# Patient Record
Sex: MALE | Race: WHITE | NOT HISPANIC OR LATINO | Employment: OTHER | URBAN - METROPOLITAN AREA
[De-identification: names, ages, dates, MRNs, and addresses within clinical notes are randomized per-mention and may not be internally consistent; named-entity substitution may affect disease eponyms.]

---

## 2023-10-10 ENCOUNTER — HOSPITAL ENCOUNTER (OUTPATIENT)
Dept: CARDIOLOGY | Facility: HOSPITAL | Age: 78
Discharge: HOME | End: 2023-10-10
Admitting: INTERNAL MEDICINE
Payer: MEDICARE

## 2023-10-10 DIAGNOSIS — I25.5 ISCHEMIC CARDIOMYOPATHY: ICD-10-CM

## 2023-10-10 DIAGNOSIS — I10 ESSENTIAL HYPERTENSION: ICD-10-CM

## 2023-10-10 DIAGNOSIS — I25.118 CORONARY ARTERY DISEASE INVOLVING NATIVE CORONARY ARTERY OF NATIVE HEART WITH OTHER FORM OF ANGINA PECTORIS: ICD-10-CM

## 2023-10-10 DIAGNOSIS — E78.2 MIXED HYPERLIPIDEMIA: ICD-10-CM

## 2023-10-10 PROCEDURE — 93306 TTE W/DOPPLER COMPLETE: CPT

## 2023-10-13 ENCOUNTER — TELEPHONE (OUTPATIENT)
Dept: CARDIOLOGY | Facility: CLINIC | Age: 78
End: 2023-10-13
Payer: MEDICARE

## 2023-10-13 LAB
BH CV ECHO MEAS - AO MAX PG: 17.8 MMHG
BH CV ECHO MEAS - AO MEAN PG: 9.1 MMHG
BH CV ECHO MEAS - AO ROOT DIAM: 4 CM
BH CV ECHO MEAS - AO V2 MAX: 210.6 CM/SEC
BH CV ECHO MEAS - AO V2 VTI: 43.4 CM
BH CV ECHO MEAS - AVA(I,D): 1.59 CM2
BH CV ECHO MEAS - EDV(CUBED): 175.6 ML
BH CV ECHO MEAS - EDV(MOD-SP2): 266 ML
BH CV ECHO MEAS - EDV(MOD-SP4): 244 ML
BH CV ECHO MEAS - EF(MOD-BP): 42 %
BH CV ECHO MEAS - EF(MOD-SP2): 42.5 %
BH CV ECHO MEAS - EF(MOD-SP4): 40.6 %
BH CV ECHO MEAS - ESV(CUBED): 79.6 ML
BH CV ECHO MEAS - ESV(MOD-SP2): 153 ML
BH CV ECHO MEAS - ESV(MOD-SP4): 145 ML
BH CV ECHO MEAS - FS: 23.2 %
BH CV ECHO MEAS - IVS/LVPW: 1.12 CM
BH CV ECHO MEAS - IVSD: 1.36 CM
BH CV ECHO MEAS - LA DIMENSION: 3 CM
BH CV ECHO MEAS - LV DIASTOLIC VOL/BSA (35-75): 102.6 CM2
BH CV ECHO MEAS - LV MASS(C)D: 308.3 GRAMS
BH CV ECHO MEAS - LV MAX PG: 2.7 MMHG
BH CV ECHO MEAS - LV MEAN PG: 1.64 MMHG
BH CV ECHO MEAS - LV SYSTOLIC VOL/BSA (12-30): 61 CM2
BH CV ECHO MEAS - LV V1 MAX: 82.4 CM/SEC
BH CV ECHO MEAS - LV V1 VTI: 17.9 CM
BH CV ECHO MEAS - LVIDD: 5.6 CM
BH CV ECHO MEAS - LVIDS: 4.3 CM
BH CV ECHO MEAS - LVOT AREA: 3.9 CM2
BH CV ECHO MEAS - LVOT DIAM: 2.21 CM
BH CV ECHO MEAS - LVPWD: 1.21 CM
BH CV ECHO MEAS - MV A MAX VEL: 93.3 CM/SEC
BH CV ECHO MEAS - MV DEC SLOPE: 605.8 CM/SEC2
BH CV ECHO MEAS - MV E MAX VEL: 52.8 CM/SEC
BH CV ECHO MEAS - MV E/A: 0.57
BH CV ECHO MEAS - MV MAX PG: 6.5 MMHG
BH CV ECHO MEAS - MV MEAN PG: 2.29 MMHG
BH CV ECHO MEAS - MV P1/2T: 39.4 MSEC
BH CV ECHO MEAS - MV V2 VTI: 25.3 CM
BH CV ECHO MEAS - MVA(P1/2T): 5.6 CM2
BH CV ECHO MEAS - MVA(VTI): 2.7 CM2
BH CV ECHO MEAS - PA ACC SLOPE: 602.8 CM/SEC2
BH CV ECHO MEAS - PA ACC TIME: 0.11 SEC
BH CV ECHO MEAS - SI(MOD-SP2): 47.5 ML/M2
BH CV ECHO MEAS - SI(MOD-SP4): 41.6 ML/M2
BH CV ECHO MEAS - SV(LVOT): 69.1 ML
BH CV ECHO MEAS - SV(MOD-SP2): 113 ML
BH CV ECHO MEAS - SV(MOD-SP4): 99 ML
BH CV ECHO MEAS - TAPSE (>1.6): 1.5 CM
BH CV XLRA - RV BASE: 4.7 CM
BH CV XLRA - RV LENGTH: 7.2 CM
BH CV XLRA - RV MID: 2.2 CM
BH CV XLRA - TDI S': 9.54 CM/SEC
LEFT ATRIUM VOLUME INDEX: 16 ML/M2
LV EF 2D ECHO EST: 40 %

## 2023-10-13 NOTE — TELEPHONE ENCOUNTER
Called patient to relay echo results.     Patient verbalizes understanding and is agreeable to plan of care.

## 2023-12-19 RX ORDER — ASPIRIN 325 MG
TABLET ORAL
Qty: 90 TABLET | Refills: 3 | Status: SHIPPED | OUTPATIENT
Start: 2023-12-19

## 2024-01-26 RX ORDER — SACUBITRIL AND VALSARTAN 24; 26 MG/1; MG/1
1 TABLET, FILM COATED ORAL 2 TIMES DAILY
Qty: 180 TABLET | Refills: 0 | Status: SHIPPED | OUTPATIENT
Start: 2024-01-26

## 2024-01-29 RX ORDER — ISOSORBIDE MONONITRATE 30 MG/1
30 TABLET, EXTENDED RELEASE ORAL DAILY
Qty: 90 TABLET | Refills: 1 | Status: SHIPPED | OUTPATIENT
Start: 2024-01-29

## 2024-02-15 ENCOUNTER — TELEPHONE (OUTPATIENT)
Dept: CARDIOLOGY | Facility: CLINIC | Age: 79
End: 2024-02-15
Payer: MEDICARE

## 2024-02-16 ENCOUNTER — OFFICE VISIT (OUTPATIENT)
Dept: CARDIOLOGY | Facility: CLINIC | Age: 79
End: 2024-02-16
Payer: MEDICARE

## 2024-02-16 VITALS
HEIGHT: 74 IN | DIASTOLIC BLOOD PRESSURE: 82 MMHG | HEART RATE: 87 BPM | BODY MASS INDEX: 29.57 KG/M2 | SYSTOLIC BLOOD PRESSURE: 132 MMHG | OXYGEN SATURATION: 96 % | WEIGHT: 230.4 LBS

## 2024-02-16 DIAGNOSIS — I25.5 ISCHEMIC CARDIOMYOPATHY: ICD-10-CM

## 2024-02-16 DIAGNOSIS — E78.2 MIXED HYPERLIPIDEMIA: ICD-10-CM

## 2024-02-16 DIAGNOSIS — I25.10 CORONARY ARTERY DISEASE INVOLVING NATIVE CORONARY ARTERY OF NATIVE HEART WITHOUT ANGINA PECTORIS: Primary | ICD-10-CM

## 2024-02-16 DIAGNOSIS — I10 ESSENTIAL HYPERTENSION: ICD-10-CM

## 2024-02-16 RX ORDER — ROSUVASTATIN CALCIUM 5 MG/1
5 TABLET, COATED ORAL NIGHTLY
COMMUNITY

## 2024-02-26 RX ORDER — CARVEDILOL 12.5 MG/1
TABLET ORAL
Qty: 180 TABLET | Refills: 2 | Status: SHIPPED | OUTPATIENT
Start: 2024-02-26

## 2024-03-16 ENCOUNTER — HOSPITAL ENCOUNTER (EMERGENCY)
Facility: HOSPITAL | Age: 79
Discharge: HOME | End: 2024-03-17
Attending: EMERGENCY MEDICINE | Admitting: EMERGENCY MEDICINE
Payer: MEDICARE

## 2024-03-16 DIAGNOSIS — I10 ELEVATED BLOOD PRESSURE READING WITH DIAGNOSIS OF HYPERTENSION: Primary | ICD-10-CM

## 2024-03-16 DIAGNOSIS — R09.89 LABILE BLOOD PRESSURE: ICD-10-CM

## 2024-03-16 PROCEDURE — 99284 EMERGENCY DEPT VISIT MOD MDM: CPT

## 2024-03-17 ENCOUNTER — APPOINTMENT (OUTPATIENT)
Dept: GENERAL RADIOLOGY | Facility: HOSPITAL | Age: 79
End: 2024-03-17
Payer: MEDICARE

## 2024-03-17 VITALS
WEIGHT: 230 LBS | RESPIRATION RATE: 18 BRPM | OXYGEN SATURATION: 94 % | BODY MASS INDEX: 29.52 KG/M2 | TEMPERATURE: 97.9 F | SYSTOLIC BLOOD PRESSURE: 152 MMHG | HEART RATE: 78 BPM | DIASTOLIC BLOOD PRESSURE: 90 MMHG | HEIGHT: 74 IN

## 2024-03-17 LAB
ALBUMIN SERPL-MCNC: 4.2 G/DL (ref 3.5–5.2)
ALBUMIN/GLOB SERPL: 1.4 G/DL
ALP SERPL-CCNC: 69 U/L (ref 39–117)
ALT SERPL W P-5'-P-CCNC: 22 U/L (ref 1–41)
ANION GAP SERPL CALCULATED.3IONS-SCNC: 11 MMOL/L (ref 5–15)
AST SERPL-CCNC: 21 U/L (ref 1–40)
BASOPHILS # BLD AUTO: 0.06 10*3/MM3 (ref 0–0.2)
BASOPHILS NFR BLD AUTO: 0.9 % (ref 0–1.5)
BILIRUB SERPL-MCNC: 0.5 MG/DL (ref 0–1.2)
BILIRUB UR QL STRIP: NEGATIVE
BUN SERPL-MCNC: 15 MG/DL (ref 8–23)
BUN/CREAT SERPL: 20 (ref 7–25)
CALCIUM SPEC-SCNC: 9.2 MG/DL (ref 8.6–10.5)
CHLORIDE SERPL-SCNC: 100 MMOL/L (ref 98–107)
CLARITY UR: CLEAR
CO2 SERPL-SCNC: 25 MMOL/L (ref 22–29)
COLOR UR: YELLOW
CREAT SERPL-MCNC: 0.75 MG/DL (ref 0.76–1.27)
DEPRECATED RDW RBC AUTO: 43.8 FL (ref 37–54)
EGFRCR SERPLBLD CKD-EPI 2021: 91.2 ML/MIN/1.73
EOSINOPHIL # BLD AUTO: 0.22 10*3/MM3 (ref 0–0.4)
EOSINOPHIL NFR BLD AUTO: 3.3 % (ref 0.3–6.2)
ERYTHROCYTE [DISTWIDTH] IN BLOOD BY AUTOMATED COUNT: 13.8 % (ref 12.3–15.4)
FLUAV SUBTYP SPEC NAA+PROBE: NOT DETECTED
FLUBV RNA ISLT QL NAA+PROBE: NOT DETECTED
GLOBULIN UR ELPH-MCNC: 2.9 GM/DL
GLUCOSE SERPL-MCNC: 117 MG/DL (ref 65–99)
GLUCOSE UR STRIP-MCNC: NEGATIVE MG/DL
HCT VFR BLD AUTO: 42 % (ref 37.5–51)
HGB BLD-MCNC: 14.1 G/DL (ref 13–17.7)
HGB UR QL STRIP.AUTO: NEGATIVE
IMM GRANULOCYTES # BLD AUTO: 0.04 10*3/MM3 (ref 0–0.05)
IMM GRANULOCYTES NFR BLD AUTO: 0.6 % (ref 0–0.5)
KETONES UR QL STRIP: ABNORMAL
LEUKOCYTE ESTERASE UR QL STRIP.AUTO: NEGATIVE
LIPASE SERPL-CCNC: 24 U/L (ref 13–60)
LYMPHOCYTES # BLD AUTO: 1.86 10*3/MM3 (ref 0.7–3.1)
LYMPHOCYTES NFR BLD AUTO: 27.8 % (ref 19.6–45.3)
MCH RBC QN AUTO: 29 PG (ref 26.6–33)
MCHC RBC AUTO-ENTMCNC: 33.6 G/DL (ref 31.5–35.7)
MCV RBC AUTO: 86.4 FL (ref 79–97)
MONOCYTES # BLD AUTO: 0.61 10*3/MM3 (ref 0.1–0.9)
MONOCYTES NFR BLD AUTO: 9.1 % (ref 5–12)
NEUTROPHILS NFR BLD AUTO: 3.9 10*3/MM3 (ref 1.7–7)
NEUTROPHILS NFR BLD AUTO: 58.3 % (ref 42.7–76)
NITRITE UR QL STRIP: NEGATIVE
NRBC BLD AUTO-RTO: 0 /100 WBC (ref 0–0.2)
NT-PROBNP SERPL-MCNC: 1124 PG/ML (ref 0–1800)
PH UR STRIP.AUTO: 5.5 [PH] (ref 5–8)
PLATELET # BLD AUTO: 260 10*3/MM3 (ref 140–450)
PMV BLD AUTO: 9.9 FL (ref 6–12)
POTASSIUM SERPL-SCNC: 4.4 MMOL/L (ref 3.5–5.2)
PROT SERPL-MCNC: 7.1 G/DL (ref 6–8.5)
PROT UR QL STRIP: NEGATIVE
QT INTERVAL: 378 MS
QT INTERVAL: 400 MS
QTC INTERVAL: 419 MS
QTC INTERVAL: 422 MS
RBC # BLD AUTO: 4.86 10*6/MM3 (ref 4.14–5.8)
SARS-COV-2 RNA RESP QL NAA+PROBE: NOT DETECTED
SODIUM SERPL-SCNC: 136 MMOL/L (ref 136–145)
SP GR UR STRIP: 1.02 (ref 1–1.03)
TROPONIN T SERPL HS-MCNC: 34 NG/L
TROPONIN T SERPL HS-MCNC: 35 NG/L
UROBILINOGEN UR QL STRIP: ABNORMAL
WBC NRBC COR # BLD AUTO: 6.69 10*3/MM3 (ref 3.4–10.8)

## 2024-03-17 PROCEDURE — 36415 COLL VENOUS BLD VENIPUNCTURE: CPT

## 2024-03-17 PROCEDURE — 83880 ASSAY OF NATRIURETIC PEPTIDE: CPT | Performed by: EMERGENCY MEDICINE

## 2024-03-17 PROCEDURE — 87636 SARSCOV2 & INF A&B AMP PRB: CPT | Performed by: EMERGENCY MEDICINE

## 2024-03-17 PROCEDURE — 81003 URINALYSIS AUTO W/O SCOPE: CPT | Performed by: EMERGENCY MEDICINE

## 2024-03-17 PROCEDURE — 71045 X-RAY EXAM CHEST 1 VIEW: CPT

## 2024-03-17 PROCEDURE — 83690 ASSAY OF LIPASE: CPT | Performed by: EMERGENCY MEDICINE

## 2024-03-17 PROCEDURE — 85025 COMPLETE CBC W/AUTO DIFF WBC: CPT | Performed by: EMERGENCY MEDICINE

## 2024-03-17 PROCEDURE — 93005 ELECTROCARDIOGRAM TRACING: CPT | Performed by: EMERGENCY MEDICINE

## 2024-03-17 PROCEDURE — 80053 COMPREHEN METABOLIC PANEL: CPT | Performed by: EMERGENCY MEDICINE

## 2024-03-17 PROCEDURE — 84484 ASSAY OF TROPONIN QUANT: CPT | Performed by: EMERGENCY MEDICINE

## 2024-03-17 RX ORDER — SODIUM CHLORIDE 0.9 % (FLUSH) 0.9 %
10 SYRINGE (ML) INJECTION AS NEEDED
Status: DISCONTINUED | OUTPATIENT
Start: 2024-03-17 | End: 2024-03-17 | Stop reason: HOSPADM

## 2024-03-17 NOTE — ED PROVIDER NOTES
Subjective   History of Present Illness  79-year-old male who presents for evaluation of mild generalized fatigue and elevated blood pressure.  The patient has a known history of hypertension.  He reports that he recently was diagnosed with urinary tract infection and completed a course of antibiotics.  He states that while he was taking those antibiotics his blood pressure was elevated.  However it seemed to improve after completion of the antibiotics.  Over the last 3 days he has noted his blood pressure to be elevated.  He reports feeling mildly fatigued but denies any other symptoms.  No chest pain, cough, shortness of breath.  No abdominal pain.  No nausea or vomiting.  No change in bowel or urinary function.  No fever or infectious symptoms.  He is awake and alert with normal mentation, normal neurological exam, GCS 15.  His blood pressure was 187/112 prior to arrival.  Because of this he did take a sublingual nitroglycerin.  He denies ever having any chest pain or shortness of breath.  Upon arrival here he does report feeling better.      Review of Systems   Constitutional:  Positive for fatigue. Negative for chills and fever.   HENT:  Negative for congestion, ear pain, postnasal drip, sinus pressure and sore throat.    Eyes:  Negative for pain, redness and visual disturbance.   Respiratory:  Negative for cough, chest tightness and shortness of breath.    Cardiovascular:  Negative for chest pain, palpitations and leg swelling.   Gastrointestinal:  Negative for abdominal pain, anal bleeding, blood in stool, diarrhea, nausea and vomiting.   Endocrine: Negative for polydipsia and polyuria.   Genitourinary:  Negative for difficulty urinating, dysuria, frequency and urgency.   Musculoskeletal:  Negative for arthralgias, back pain and neck pain.   Skin:  Negative for pallor and rash.   Allergic/Immunologic: Negative for environmental allergies and immunocompromised state.   Neurological:  Negative for dizziness,  weakness and headaches.   Hematological:  Negative for adenopathy.   Psychiatric/Behavioral:  Negative for confusion, self-injury and suicidal ideas. The patient is not nervous/anxious.    All other systems reviewed and are negative.      Past Medical History:   Diagnosis Date    Coronary artery disease     COVID-19     Heart attack     HTN (hypertension)     Hyperlipidemia, unspecified        No Known Allergies    Past Surgical History:   Procedure Laterality Date    CARDIAC CATHETERIZATION N/A 05/27/2018    Procedure: Left Heart Cath;  Surgeon: Boaz Varela MD;  Location:  [facility];  Service: Cardiology    COLONOSCOPY N/A 11/01/2021    Procedure: COLONOSCOPY;  Surgeon: Brunner, Mark I, MD;  Location:  [facility];  Service: Gastroenterology;  Laterality: N/A;    CORONARY STENT PLACEMENT      KNEE SURGERY      KNEE SURGERY      SKIN SURGERY  05/2023    removal of sun spots    TOE SURGERY Right        Family History   Problem Relation Age of Onset    Heart failure Mother     Cancer Father     Coronary artery disease Brother     Obesity Brother     Osteoarthritis Brother        Social History     Socioeconomic History    Marital status:     Number of children: 2   Tobacco Use    Smoking status: Former     Current packs/day: 1.00     Types: Cigarettes    Smokeless tobacco: Never    Tobacco comments:     teenage years   Vaping Use    Vaping status: Never Used   Substance and Sexual Activity    Alcohol use: No    Drug use: No    Sexual activity: Defer           Objective   Physical Exam  Vitals and nursing note reviewed.   Constitutional:       General: He is not in acute distress.     Appearance: Normal appearance. He is well-developed. He is not toxic-appearing or diaphoretic.   HENT:      Head: Normocephalic and atraumatic.      Right Ear: External ear normal.      Left Ear: External ear normal.      Nose: Nose normal.   Eyes:      General: Lids are normal.      Pupils: Pupils are  equal, round, and reactive to light.   Neck:      Trachea: No tracheal deviation.   Cardiovascular:      Rate and Rhythm: Normal rate and regular rhythm.      Pulses: No decreased pulses.      Heart sounds: Normal heart sounds. No murmur heard.     No friction rub. No gallop.   Pulmonary:      Effort: Pulmonary effort is normal. No respiratory distress.      Breath sounds: Normal breath sounds. No decreased breath sounds, wheezing, rhonchi or rales.   Abdominal:      General: Bowel sounds are normal.      Palpations: Abdomen is soft.      Tenderness: There is no abdominal tenderness. There is no guarding or rebound.   Musculoskeletal:         General: No deformity. Normal range of motion.      Cervical back: Normal range of motion and neck supple.   Lymphadenopathy:      Cervical: No cervical adenopathy.   Skin:     General: Skin is warm and dry.      Findings: No rash.   Neurological:      Mental Status: He is alert and oriented to person, place, and time.      Cranial Nerves: No cranial nerve deficit.      Sensory: No sensory deficit.   Psychiatric:         Speech: Speech normal.         Behavior: Behavior normal.         Thought Content: Thought content normal.         Judgment: Judgment normal.         Procedures           ED Course  ED Course as of 03/17/24 1205   Sun Mar 17, 2024   0044 XR Chest 1 View [NS]      ED Course User Index  [NS] Josephine Reaves MD                                             Medical Decision Making  Differential diagnosis includes essential hypertension, hypertensive urgency, hypertensive emergency, acute renal insufficiency, acute coronary syndrome, CHF exacerbation, other unspecified etiology.    Urine does not show any signs of infection.  COVID and flu test are negative.  Chest x-ray shows no acute disease.    The patient's blood pressure has trended down with observation alone.  He has remained well-appearing with normal neurological exam and no signs of an acute  stroke.    Lab work is pending at this given time.  Initial troponin is 34.  Repeat troponin is pending.    Repeat troponin essentially unchanged.    Chemistries evaluation show no signs of acute endorgan damage secondary to high blood pressure.    The patient has remained well-appearing throughout the ER course.    Will be discharged with advised to follow-up primary care physician to discuss further blood pressure management.    He is advised to check his blood pressure twice daily.  He should be sitting in the chair with his arm at heart level for roughly 5 to 10 minutes.  Write down how you feel before you record your blood pressure.  After reporting several weeks of data take that to the primary care physician.    Problems Addressed:  Elevated blood pressure reading with diagnosis of hypertension: complicated acute illness or injury with systemic symptoms  Labile blood pressure: complicated acute illness or injury with systemic symptoms    Amount and/or Complexity of Data Reviewed  External Data Reviewed: labs, radiology and ECG.  Labs: ordered. Decision-making details documented in ED Course.  Radiology: ordered and independent interpretation performed. Decision-making details documented in ED Course.  ECG/medicine tests: ordered and independent interpretation performed. Decision-making details documented in ED Course.     Details: EKG independently interpreted myself shows sinus rhythm with first-degree AV block and T wave inversions in the high lateral leads.  The T wave inversions are unchanged relative to previous comparison in July 2023.    Risk  Prescription drug management.        Final diagnoses:   Elevated blood pressure reading with diagnosis of hypertension   Labile blood pressure       ED Disposition  ED Disposition       ED Disposition   Discharge    Condition   Stable    Comment   --               Gabriela Olea APRN  [address] [place] [id number] [phone]    Call       [facility]  [place] EMERGENCY DEPARTMENT  [address] [facility] [address]  [phone]  Go to   If symptoms worsen         Medication List      No changes were made to your prescriptions during this visit.            Josephine Reaves MD  03/17/24 [id number]

## 2024-03-18 ENCOUNTER — TELEPHONE (OUTPATIENT)
Dept: CARDIOLOGY | Facility: CLINIC | Age: 79
End: 2024-03-18
Payer: MEDICARE

## 2024-03-18 NOTE — TELEPHONE ENCOUNTER
Patient called today to report that he has been experiencing frequent low BP. Patient states before taking his medication his BP is 160s. After meds BP was 100-110s. He was told by Dr. Varela to take half a tab of entresto each day unless his BP is greater than 140, take a whole pill. He has taken a whole pill each day for 6 days.     Patient states he was just in the ER for hypertension on 3/16 when his BP was 180/100. At ER patient's -150s. No medications given. Patient monitored and DC a couple hours later.    Patient seems very anxious about his BP and takes it very often. Advised patient to only take BP 1-2 hours after taking his medication. Patient states he is trying to make sure it comes up/goes down.     Please advise! Thanks,   Gifty MOORE

## 2024-03-29 LAB
QT INTERVAL: 378 MS
QT INTERVAL: 400 MS
QTC INTERVAL: 419 MS
QTC INTERVAL: 422 MS

## 2024-04-26 RX ORDER — SACUBITRIL AND VALSARTAN 24; 26 MG/1; MG/1
1 TABLET, FILM COATED ORAL 2 TIMES DAILY
Qty: 180 TABLET | Refills: 0 | Status: SHIPPED | OUTPATIENT
Start: 2024-04-26

## 2024-06-05 ENCOUNTER — APPOINTMENT (OUTPATIENT)
Dept: GENERAL RADIOLOGY | Facility: HOSPITAL | Age: 79
End: 2024-06-05
Payer: MEDICARE

## 2024-06-05 ENCOUNTER — HOSPITAL ENCOUNTER (OUTPATIENT)
Facility: HOSPITAL | Age: 79
Setting detail: OBSERVATION
Discharge: HOME | End: 2024-06-06
Attending: EMERGENCY MEDICINE | Admitting: INTERNAL MEDICINE
Payer: MEDICARE

## 2024-06-05 DIAGNOSIS — I25.2 HISTORY OF MYOCARDIAL INFARCTION: ICD-10-CM

## 2024-06-05 DIAGNOSIS — M79.602 LEFT ARM PAIN: ICD-10-CM

## 2024-06-05 DIAGNOSIS — R07.89 ATYPICAL CHEST PAIN: Primary | ICD-10-CM

## 2024-06-05 DIAGNOSIS — Z86.39 HISTORY OF HYPERLIPIDEMIA: ICD-10-CM

## 2024-06-05 DIAGNOSIS — Z86.79 HISTORY OF CORONARY ARTERY DISEASE: ICD-10-CM

## 2024-06-05 DIAGNOSIS — Z86.79 HISTORY OF HYPERTENSION: ICD-10-CM

## 2024-06-05 PROBLEM — R07.9 CHEST PAIN: Status: ACTIVE | Noted: 2024-06-05

## 2024-06-05 LAB
ALBUMIN SERPL-MCNC: 4.4 G/DL (ref 3.5–5.2)
ALBUMIN/GLOB SERPL: 1.5 G/DL
ALP SERPL-CCNC: 52 U/L (ref 39–117)
ALT SERPL W P-5'-P-CCNC: 17 U/L (ref 1–41)
ANION GAP SERPL CALCULATED.3IONS-SCNC: 10 MMOL/L (ref 5–15)
AST SERPL-CCNC: 18 U/L (ref 1–40)
BASOPHILS # BLD AUTO: 0.07 10*3/MM3 (ref 0–0.2)
BASOPHILS NFR BLD AUTO: 0.9 % (ref 0–1.5)
BILIRUB SERPL-MCNC: 0.3 MG/DL (ref 0–1.2)
BUN SERPL-MCNC: 22 MG/DL (ref 8–23)
BUN/CREAT SERPL: 18.5 (ref 7–25)
CALCIUM SPEC-SCNC: 9.7 MG/DL (ref 8.6–10.5)
CHLORIDE SERPL-SCNC: 106 MMOL/L (ref 98–107)
CO2 SERPL-SCNC: 25 MMOL/L (ref 22–29)
CREAT SERPL-MCNC: 1.19 MG/DL (ref 0.76–1.27)
DEPRECATED RDW RBC AUTO: 44.3 FL (ref 37–54)
EGFRCR SERPLBLD CKD-EPI 2021: 61.8 ML/MIN/1.73
EOSINOPHIL # BLD AUTO: 0.16 10*3/MM3 (ref 0–0.4)
EOSINOPHIL NFR BLD AUTO: 2.1 % (ref 0.3–6.2)
ERYTHROCYTE [DISTWIDTH] IN BLOOD BY AUTOMATED COUNT: 13.7 % (ref 12.3–15.4)
GEN 5 2HR TROPONIN T REFLEX: 23 NG/L
GLOBULIN UR ELPH-MCNC: 2.9 GM/DL
GLUCOSE SERPL-MCNC: 124 MG/DL (ref 65–99)
HCT VFR BLD AUTO: 44.5 % (ref 37.5–51)
HGB BLD-MCNC: 14.7 G/DL (ref 13–17.7)
HOLD SPECIMEN: NORMAL
IMM GRANULOCYTES # BLD AUTO: 0.04 10*3/MM3 (ref 0–0.05)
IMM GRANULOCYTES NFR BLD AUTO: 0.5 % (ref 0–0.5)
LIPASE SERPL-CCNC: 16 U/L (ref 13–60)
LYMPHOCYTES # BLD AUTO: 1.9 10*3/MM3 (ref 0.7–3.1)
LYMPHOCYTES NFR BLD AUTO: 24.9 % (ref 19.6–45.3)
MCH RBC QN AUTO: 28.9 PG (ref 26.6–33)
MCHC RBC AUTO-ENTMCNC: 33 G/DL (ref 31.5–35.7)
MCV RBC AUTO: 87.6 FL (ref 79–97)
MONOCYTES # BLD AUTO: 0.77 10*3/MM3 (ref 0.1–0.9)
MONOCYTES NFR BLD AUTO: 10.1 % (ref 5–12)
NEUTROPHILS NFR BLD AUTO: 4.69 10*3/MM3 (ref 1.7–7)
NEUTROPHILS NFR BLD AUTO: 61.5 % (ref 42.7–76)
NRBC BLD AUTO-RTO: 0 /100 WBC (ref 0–0.2)
NT-PROBNP SERPL-MCNC: 1036 PG/ML (ref 0–1800)
PLATELET # BLD AUTO: 221 10*3/MM3 (ref 140–450)
PMV BLD AUTO: 9.8 FL (ref 6–12)
POTASSIUM SERPL-SCNC: 4.2 MMOL/L (ref 3.5–5.2)
PROT SERPL-MCNC: 7.3 G/DL (ref 6–8.5)
RBC # BLD AUTO: 5.08 10*6/MM3 (ref 4.14–5.8)
SODIUM SERPL-SCNC: 141 MMOL/L (ref 136–145)
TROPONIN T DELTA: 0 NG/L
TROPONIN T SERPL HS-MCNC: 23 NG/L
WBC NRBC COR # BLD AUTO: 7.63 10*3/MM3 (ref 3.4–10.8)
WHOLE BLOOD HOLD COAG: NORMAL
WHOLE BLOOD HOLD SPECIMEN: NORMAL

## 2024-06-05 PROCEDURE — 85379 FIBRIN DEGRADATION QUANT: CPT | Performed by: NURSE PRACTITIONER

## 2024-06-05 PROCEDURE — 86140 C-REACTIVE PROTEIN: CPT | Performed by: NURSE PRACTITIONER

## 2024-06-05 PROCEDURE — 71045 X-RAY EXAM CHEST 1 VIEW: CPT

## 2024-06-05 PROCEDURE — 85025 COMPLETE CBC W/AUTO DIFF WBC: CPT

## 2024-06-05 PROCEDURE — 93005 ELECTROCARDIOGRAM TRACING: CPT

## 2024-06-05 PROCEDURE — 99285 EMERGENCY DEPT VISIT HI MDM: CPT

## 2024-06-05 PROCEDURE — 84484 ASSAY OF TROPONIN QUANT: CPT

## 2024-06-05 PROCEDURE — 83880 ASSAY OF NATRIURETIC PEPTIDE: CPT

## 2024-06-05 PROCEDURE — 36415 COLL VENOUS BLD VENIPUNCTURE: CPT

## 2024-06-05 PROCEDURE — 80053 COMPREHEN METABOLIC PANEL: CPT

## 2024-06-05 PROCEDURE — 85652 RBC SED RATE AUTOMATED: CPT | Performed by: NURSE PRACTITIONER

## 2024-06-05 PROCEDURE — 83690 ASSAY OF LIPASE: CPT

## 2024-06-05 RX ORDER — SODIUM CHLORIDE 0.9 % (FLUSH) 0.9 %
10 SYRINGE (ML) INJECTION AS NEEDED
Status: DISCONTINUED | OUTPATIENT
Start: 2024-06-05 | End: 2024-06-06 | Stop reason: SDUPTHER

## 2024-06-05 RX ORDER — ASPIRIN 81 MG/1
324 TABLET, CHEWABLE ORAL ONCE
Status: DISCONTINUED | OUTPATIENT
Start: 2024-06-05 | End: 2024-06-06

## 2024-06-05 NOTE — Clinical Note
Level of Care: Telemetry [5]   Diagnosis: Chest pain [028097]   Admitting Physician: GWEN KENNEDY [[id number]]   Attending Physician: GWEN KENNEDY [[id number]]   Bed Request Comments: tele

## 2024-06-06 ENCOUNTER — APPOINTMENT (OUTPATIENT)
Dept: GENERAL RADIOLOGY | Facility: HOSPITAL | Age: 79
End: 2024-06-06
Payer: MEDICARE

## 2024-06-06 ENCOUNTER — APPOINTMENT (OUTPATIENT)
Dept: CARDIOLOGY | Facility: HOSPITAL | Age: 79
End: 2024-06-06
Payer: MEDICARE

## 2024-06-06 VITALS
RESPIRATION RATE: 17 BRPM | BODY MASS INDEX: 29.14 KG/M2 | DIASTOLIC BLOOD PRESSURE: 88 MMHG | TEMPERATURE: 98.4 F | SYSTOLIC BLOOD PRESSURE: 116 MMHG | WEIGHT: 227.07 LBS | HEIGHT: 74 IN | OXYGEN SATURATION: 94 % | HEART RATE: 109 BPM

## 2024-06-06 PROBLEM — M25.512 ACUTE PAIN OF LEFT SHOULDER: Status: ACTIVE | Noted: 2024-06-06

## 2024-06-06 PROBLEM — M25.512 ACUTE PAIN OF LEFT SHOULDER: Status: RESOLVED | Noted: 2024-06-06 | Resolved: 2024-06-06

## 2024-06-06 PROBLEM — R07.9 CHEST PAIN: Status: RESOLVED | Noted: 2024-06-05 | Resolved: 2024-06-06

## 2024-06-06 LAB
ANION GAP SERPL CALCULATED.3IONS-SCNC: 12 MMOL/L (ref 5–15)
BASOPHILS # BLD AUTO: 0.05 10*3/MM3 (ref 0–0.2)
BASOPHILS NFR BLD AUTO: 0.6 % (ref 0–1.5)
BH CV REST NUCLEAR ISOTOPE DOSE: 29.9 MCI
BH CV STRESS BP STAGE 1: NORMAL
BH CV STRESS BP STAGE 4: NORMAL
BH CV STRESS COMMENTS STAGE 1: NORMAL
BH CV STRESS DOSE REGADENOSON STAGE 1: 0.4
BH CV STRESS DURATION MIN STAGE 1: 1
BH CV STRESS DURATION MIN STAGE 2: 1
BH CV STRESS DURATION MIN STAGE 3: 1
BH CV STRESS DURATION MIN STAGE 4: 1
BH CV STRESS DURATION SEC STAGE 1: 0
BH CV STRESS DURATION SEC STAGE 2: 0
BH CV STRESS DURATION SEC STAGE 3: 0
BH CV STRESS DURATION SEC STAGE 4: 0
BH CV STRESS HR STAGE 1: 80
BH CV STRESS HR STAGE 2: 92
BH CV STRESS HR STAGE 3: 96
BH CV STRESS HR STAGE 4: 98
BH CV STRESS NUCLEAR ISOTOPE DOSE: 29.9 MCI
BH CV STRESS O2 STAGE 1: 96
BH CV STRESS O2 STAGE 2: 96
BH CV STRESS O2 STAGE 3: 98
BH CV STRESS O2 STAGE 4: 98
BH CV STRESS PROTOCOL 1: NORMAL
BH CV STRESS RECOVERY BP: NORMAL MMHG
BH CV STRESS RECOVERY HR: 87 BPM
BH CV STRESS RECOVERY O2: 98 %
BH CV STRESS STAGE 1: 1
BH CV STRESS STAGE 2: 2
BH CV STRESS STAGE 3: 3
BH CV STRESS STAGE 4: 4
BUN SERPL-MCNC: 17 MG/DL (ref 8–23)
BUN/CREAT SERPL: 19.1 (ref 7–25)
CALCIUM SPEC-SCNC: 9.6 MG/DL (ref 8.6–10.5)
CHLORIDE SERPL-SCNC: 105 MMOL/L (ref 98–107)
CHOLEST SERPL-MCNC: 143 MG/DL (ref 0–200)
CO2 SERPL-SCNC: 24 MMOL/L (ref 22–29)
CREAT SERPL-MCNC: 0.89 MG/DL (ref 0.76–1.27)
CRP SERPL-MCNC: <0.3 MG/DL (ref 0–0.5)
D DIMER PPP FEU-MCNC: <0.27 MCGFEU/ML (ref 0–0.8)
DEPRECATED RDW RBC AUTO: 42 FL (ref 37–54)
EGFRCR SERPLBLD CKD-EPI 2021: 86.6 ML/MIN/1.73
EOSINOPHIL # BLD AUTO: 0.09 10*3/MM3 (ref 0–0.4)
EOSINOPHIL NFR BLD AUTO: 1 % (ref 0.3–6.2)
ERYTHROCYTE [DISTWIDTH] IN BLOOD BY AUTOMATED COUNT: 13.5 % (ref 12.3–15.4)
ERYTHROCYTE [SEDIMENTATION RATE] IN BLOOD: 14 MM/HR (ref 0–20)
GLUCOSE SERPL-MCNC: 125 MG/DL (ref 65–99)
HBA1C MFR BLD: 5.5 % (ref 4.8–5.6)
HCT VFR BLD AUTO: 44.8 % (ref 37.5–51)
HDLC SERPL-MCNC: 43 MG/DL (ref 40–60)
HGB BLD-MCNC: 15.2 G/DL (ref 13–17.7)
IMM GRANULOCYTES # BLD AUTO: 0.03 10*3/MM3 (ref 0–0.05)
IMM GRANULOCYTES NFR BLD AUTO: 0.3 % (ref 0–0.5)
LDLC SERPL CALC-MCNC: 78 MG/DL (ref 0–100)
LDLC/HDLC SERPL: 1.76 {RATIO}
LV EF NUC BP: 37 %
LYMPHOCYTES # BLD AUTO: 1.88 10*3/MM3 (ref 0.7–3.1)
LYMPHOCYTES NFR BLD AUTO: 21.8 % (ref 19.6–45.3)
MAXIMAL PREDICTED HEART RATE: 140 BPM
MCH RBC QN AUTO: 28.7 PG (ref 26.6–33)
MCHC RBC AUTO-ENTMCNC: 33.9 G/DL (ref 31.5–35.7)
MCV RBC AUTO: 84.5 FL (ref 79–97)
MONOCYTES # BLD AUTO: 0.67 10*3/MM3 (ref 0.1–0.9)
MONOCYTES NFR BLD AUTO: 7.8 % (ref 5–12)
NEUTROPHILS NFR BLD AUTO: 5.92 10*3/MM3 (ref 1.7–7)
NEUTROPHILS NFR BLD AUTO: 68.5 % (ref 42.7–76)
NRBC BLD AUTO-RTO: 0 /100 WBC (ref 0–0.2)
PERCENT MAX PREDICTED HR: 70 %
PLATELET # BLD AUTO: 222 10*3/MM3 (ref 140–450)
PMV BLD AUTO: 9.7 FL (ref 6–12)
POTASSIUM SERPL-SCNC: 3.8 MMOL/L (ref 3.5–5.2)
QT INTERVAL: 406 MS
QTC INTERVAL: 447 MS
RBC # BLD AUTO: 5.3 10*6/MM3 (ref 4.14–5.8)
SODIUM SERPL-SCNC: 141 MMOL/L (ref 136–145)
STRESS BASELINE BP: NORMAL MMHG
STRESS BASELINE HR: 71 BPM
STRESS O2 SAT REST: 95 %
STRESS PERCENT HR: 82 %
STRESS POST ESTIMATED WORKLOAD: 1 METS
STRESS POST EXERCISE DUR MIN: 4 MIN
STRESS POST EXERCISE DUR SEC: 0 SEC
STRESS POST O2 SAT PEAK: 98 %
STRESS POST PEAK BP: NORMAL MMHG
STRESS POST PEAK HR: 98 BPM
STRESS TARGET HR: 119 BPM
TRIGL SERPL-MCNC: 122 MG/DL (ref 0–150)
TROPONIN T SERPL HS-MCNC: 22 NG/L
VLDLC SERPL-MCNC: 22 MG/DL (ref 5–40)
WBC NRBC COR # BLD AUTO: 8.64 10*3/MM3 (ref 3.4–10.8)

## 2024-06-06 PROCEDURE — 25010000002 HEPARIN (PORCINE) PER 1000 UNITS: Performed by: NURSE PRACTITIONER

## 2024-06-06 PROCEDURE — 97165 OT EVAL LOW COMPLEX 30 MIN: CPT

## 2024-06-06 PROCEDURE — 93018 CV STRESS TEST I&R ONLY: CPT | Performed by: INTERNAL MEDICINE

## 2024-06-06 PROCEDURE — 85025 COMPLETE CBC W/AUTO DIFF WBC: CPT | Performed by: NURSE PRACTITIONER

## 2024-06-06 PROCEDURE — 99222 1ST HOSP IP/OBS MODERATE 55: CPT | Performed by: NURSE PRACTITIONER

## 2024-06-06 PROCEDURE — 96372 THER/PROPH/DIAG INJ SC/IM: CPT

## 2024-06-06 PROCEDURE — 83036 HEMOGLOBIN GLYCOSYLATED A1C: CPT | Performed by: INTERNAL MEDICINE

## 2024-06-06 PROCEDURE — 25010000002 REGADENOSON 0.4 MG/5ML SOLUTION: Performed by: NURSE PRACTITIONER

## 2024-06-06 PROCEDURE — 84484 ASSAY OF TROPONIN QUANT: CPT | Performed by: NURSE PRACTITIONER

## 2024-06-06 PROCEDURE — 78431 MYOCRD IMG PET RST&STRS CT: CPT

## 2024-06-06 PROCEDURE — G0378 HOSPITAL OBSERVATION PER HR: HCPCS

## 2024-06-06 PROCEDURE — 80061 LIPID PANEL: CPT | Performed by: INTERNAL MEDICINE

## 2024-06-06 PROCEDURE — 73030 X-RAY EXAM OF SHOULDER: CPT

## 2024-06-06 PROCEDURE — 93017 CV STRESS TEST TRACING ONLY: CPT

## 2024-06-06 PROCEDURE — 80048 BASIC METABOLIC PNL TOTAL CA: CPT | Performed by: NURSE PRACTITIONER

## 2024-06-06 PROCEDURE — 99214 OFFICE O/P EST MOD 30 MIN: CPT | Performed by: INTERNAL MEDICINE

## 2024-06-06 PROCEDURE — 0 RUBIDIUM CHLORIDE: Performed by: NURSE PRACTITIONER

## 2024-06-06 PROCEDURE — 93010 ELECTROCARDIOGRAM REPORT: CPT | Performed by: INTERNAL MEDICINE

## 2024-06-06 PROCEDURE — 97161 PT EVAL LOW COMPLEX 20 MIN: CPT

## 2024-06-06 PROCEDURE — 97530 THERAPEUTIC ACTIVITIES: CPT

## 2024-06-06 PROCEDURE — 93005 ELECTROCARDIOGRAM TRACING: CPT | Performed by: INTERNAL MEDICINE

## 2024-06-06 PROCEDURE — A9555 RB82 RUBIDIUM: HCPCS | Performed by: NURSE PRACTITIONER

## 2024-06-06 PROCEDURE — 78431 MYOCRD IMG PET RST&STRS CT: CPT | Performed by: INTERNAL MEDICINE

## 2024-06-06 RX ORDER — SACUBITRIL AND VALSARTAN 24; 26 MG/1; MG/1
0.5 TABLET, FILM COATED ORAL 2 TIMES DAILY
Start: 2024-06-06 | End: 2024-06-06

## 2024-06-06 RX ORDER — SACUBITRIL AND VALSARTAN 24; 26 MG/1; MG/1
1 TABLET, FILM COATED ORAL 2 TIMES DAILY
Qty: 60 TABLET | Refills: 2 | Status: SHIPPED | OUTPATIENT
Start: 2024-06-06

## 2024-06-06 RX ORDER — POLYETHYLENE GLYCOL 3350 17 G/17G
17 POWDER, FOR SOLUTION ORAL DAILY PRN
Status: DISCONTINUED | OUTPATIENT
Start: 2024-06-06 | End: 2024-06-06 | Stop reason: HOSPADM

## 2024-06-06 RX ORDER — DIPHENOXYLATE HYDROCHLORIDE AND ATROPINE SULFATE 2.5; .025 MG/1; MG/1
1 TABLET ORAL DAILY
Status: DISCONTINUED | OUTPATIENT
Start: 2024-06-06 | End: 2024-06-06 | Stop reason: HOSPADM

## 2024-06-06 RX ORDER — ROSUVASTATIN CALCIUM 10 MG/1
5 TABLET, COATED ORAL NIGHTLY
Status: DISCONTINUED | OUTPATIENT
Start: 2024-06-06 | End: 2024-06-06 | Stop reason: HOSPADM

## 2024-06-06 RX ORDER — SODIUM CHLORIDE 0.9 % (FLUSH) 0.9 %
10 SYRINGE (ML) INJECTION AS NEEDED
Status: DISCONTINUED | OUTPATIENT
Start: 2024-06-06 | End: 2024-06-06 | Stop reason: HOSPADM

## 2024-06-06 RX ORDER — CARVEDILOL 12.5 MG/1
12.5 TABLET ORAL 2 TIMES DAILY WITH MEALS
Status: DISCONTINUED | OUTPATIENT
Start: 2024-06-06 | End: 2024-06-06 | Stop reason: HOSPADM

## 2024-06-06 RX ORDER — ACETAMINOPHEN 325 MG/1
650 TABLET ORAL EVERY 4 HOURS PRN
Status: DISCONTINUED | OUTPATIENT
Start: 2024-06-06 | End: 2024-06-06 | Stop reason: HOSPADM

## 2024-06-06 RX ORDER — BISACODYL 5 MG/1
5 TABLET, DELAYED RELEASE ORAL DAILY PRN
Status: DISCONTINUED | OUTPATIENT
Start: 2024-06-06 | End: 2024-06-06 | Stop reason: HOSPADM

## 2024-06-06 RX ORDER — BISACODYL 10 MG
10 SUPPOSITORY, RECTAL RECTAL DAILY PRN
Status: DISCONTINUED | OUTPATIENT
Start: 2024-06-06 | End: 2024-06-06 | Stop reason: HOSPADM

## 2024-06-06 RX ORDER — ACETAMINOPHEN 160 MG/5ML
650 SOLUTION ORAL EVERY 4 HOURS PRN
Status: DISCONTINUED | OUTPATIENT
Start: 2024-06-06 | End: 2024-06-06 | Stop reason: HOSPADM

## 2024-06-06 RX ORDER — CARVEDILOL 12.5 MG/1
12.5 TABLET ORAL 2 TIMES DAILY WITH MEALS
Qty: 60 TABLET | Refills: 2 | Status: SHIPPED | OUTPATIENT
Start: 2024-06-06

## 2024-06-06 RX ORDER — REGADENOSON 0.08 MG/ML
0.4 INJECTION, SOLUTION INTRAVENOUS ONCE
Status: COMPLETED | OUTPATIENT
Start: 2024-06-06 | End: 2024-06-06

## 2024-06-06 RX ORDER — ASPIRIN 325 MG
325 TABLET ORAL DAILY
Status: DISCONTINUED | OUTPATIENT
Start: 2024-06-06 | End: 2024-06-06 | Stop reason: HOSPADM

## 2024-06-06 RX ORDER — NITROGLYCERIN 0.4 MG/1
0.4 TABLET SUBLINGUAL
Status: DISCONTINUED | OUTPATIENT
Start: 2024-06-06 | End: 2024-06-06 | Stop reason: HOSPADM

## 2024-06-06 RX ORDER — ONDANSETRON 4 MG/1
4 TABLET, ORALLY DISINTEGRATING ORAL EVERY 6 HOURS PRN
Status: DISCONTINUED | OUTPATIENT
Start: 2024-06-06 | End: 2024-06-06 | Stop reason: HOSPADM

## 2024-06-06 RX ORDER — HEPARIN SODIUM 5000 [USP'U]/ML
5000 INJECTION, SOLUTION INTRAVENOUS; SUBCUTANEOUS EVERY 8 HOURS SCHEDULED
Status: DISCONTINUED | OUTPATIENT
Start: 2024-06-06 | End: 2024-06-06 | Stop reason: HOSPADM

## 2024-06-06 RX ORDER — KETOROLAC TROMETHAMINE 15 MG/ML
15 INJECTION, SOLUTION INTRAMUSCULAR; INTRAVENOUS ONCE
Status: DISCONTINUED | OUTPATIENT
Start: 2024-06-06 | End: 2024-06-06

## 2024-06-06 RX ORDER — CARVEDILOL 6.25 MG/1
6.25 TABLET ORAL 2 TIMES DAILY WITH MEALS
Status: DISCONTINUED | OUTPATIENT
Start: 2024-06-06 | End: 2024-06-06

## 2024-06-06 RX ORDER — ISOSORBIDE MONONITRATE 30 MG/1
15 TABLET, EXTENDED RELEASE ORAL DAILY
Status: DISCONTINUED | OUTPATIENT
Start: 2024-06-06 | End: 2024-06-06 | Stop reason: HOSPADM

## 2024-06-06 RX ORDER — UREA 10 %
5 LOTION (ML) TOPICAL NIGHTLY PRN
Status: DISCONTINUED | OUTPATIENT
Start: 2024-06-06 | End: 2024-06-06 | Stop reason: HOSPADM

## 2024-06-06 RX ORDER — TRAMADOL HYDROCHLORIDE 50 MG/1
25 TABLET ORAL EVERY 6 HOURS PRN
Status: DISCONTINUED | OUTPATIENT
Start: 2024-06-06 | End: 2024-06-06 | Stop reason: HOSPADM

## 2024-06-06 RX ORDER — SODIUM CHLORIDE 9 MG/ML
40 INJECTION, SOLUTION INTRAVENOUS AS NEEDED
Status: DISCONTINUED | OUTPATIENT
Start: 2024-06-06 | End: 2024-06-06 | Stop reason: HOSPADM

## 2024-06-06 RX ORDER — ACETAMINOPHEN 325 MG/1
650 TABLET ORAL EVERY 6 HOURS PRN
Status: DISCONTINUED | OUTPATIENT
Start: 2024-06-06 | End: 2024-06-06 | Stop reason: SDUPTHER

## 2024-06-06 RX ORDER — TRAMADOL HYDROCHLORIDE 50 MG/1
50 TABLET ORAL EVERY 6 HOURS PRN
Qty: 12 TABLET | Refills: 0 | Status: SHIPPED | OUTPATIENT
Start: 2024-06-06 | End: 2024-06-09

## 2024-06-06 RX ORDER — ONDANSETRON 2 MG/ML
4 INJECTION INTRAMUSCULAR; INTRAVENOUS EVERY 6 HOURS PRN
Status: DISCONTINUED | OUTPATIENT
Start: 2024-06-06 | End: 2024-06-06 | Stop reason: HOSPADM

## 2024-06-06 RX ORDER — SODIUM CHLORIDE 0.9 % (FLUSH) 0.9 %
10 SYRINGE (ML) INJECTION EVERY 12 HOURS SCHEDULED
Status: DISCONTINUED | OUTPATIENT
Start: 2024-06-06 | End: 2024-06-06 | Stop reason: HOSPADM

## 2024-06-06 RX ORDER — ISOSORBIDE MONONITRATE 30 MG/1
15 TABLET, EXTENDED RELEASE ORAL DAILY
COMMUNITY

## 2024-06-06 RX ORDER — AMOXICILLIN 250 MG
2 CAPSULE ORAL 2 TIMES DAILY PRN
Status: DISCONTINUED | OUTPATIENT
Start: 2024-06-06 | End: 2024-06-06 | Stop reason: HOSPADM

## 2024-06-06 RX ORDER — ACETAMINOPHEN 650 MG/1
650 SUPPOSITORY RECTAL EVERY 4 HOURS PRN
Status: DISCONTINUED | OUTPATIENT
Start: 2024-06-06 | End: 2024-06-06 | Stop reason: HOSPADM

## 2024-06-06 RX ADMIN — REGADENOSON 0.4 MG: 0.08 INJECTION, SOLUTION INTRAVENOUS at 13:18

## 2024-06-06 RX ADMIN — MULTIVITAMIN TABLET 1 TABLET: TABLET at 08:56

## 2024-06-06 RX ADMIN — ASPIRIN 325 MG: 325 TABLET ORAL at 08:56

## 2024-06-06 RX ADMIN — TRAMADOL HYDROCHLORIDE 25 MG: 50 TABLET ORAL at 03:22

## 2024-06-06 RX ADMIN — SACUBITRIL AND VALSARTAN 0.5 TABLET: 24; 26 TABLET, FILM COATED ORAL at 03:33

## 2024-06-06 RX ADMIN — ISOSORBIDE MONONITRATE 15 MG: 30 TABLET, EXTENDED RELEASE ORAL at 08:56

## 2024-06-06 RX ADMIN — HEPARIN SODIUM 5000 UNITS: 5000 INJECTION INTRAVENOUS; SUBCUTANEOUS at 05:13

## 2024-06-06 RX ADMIN — ACETAMINOPHEN 650 MG: 325 TABLET ORAL at 08:56

## 2024-06-06 RX ADMIN — CARVEDILOL 6.25 MG: 6.25 TABLET, FILM COATED ORAL at 03:33

## 2024-06-06 RX ADMIN — Medication 2 G: at 08:56

## 2024-06-06 RX ADMIN — TRAMADOL HYDROCHLORIDE 25 MG: 50 TABLET ORAL at 11:59

## 2024-06-06 RX ADMIN — RUBIDIUM CHLORIDE RB-82 1 DOSE: 150 INJECTION, SOLUTION INTRAVENOUS at 13:06

## 2024-06-06 RX ADMIN — Medication 10 ML: at 08:56

## 2024-06-06 RX ADMIN — ACETAMINOPHEN 650 MG: 325 TABLET ORAL at 14:46

## 2024-06-06 RX ADMIN — RUBIDIUM CHLORIDE RB-82 1 DOSE: 150 INJECTION, SOLUTION INTRAVENOUS at 13:18

## 2024-06-06 RX ADMIN — HEPARIN SODIUM 5000 UNITS: 5000 INJECTION INTRAVENOUS; SUBCUTANEOUS at 14:47

## 2024-06-06 NOTE — H&P
[facility] Medicine Services  HISTORY AND PHYSICAL    Patient Name: Tommy Estrada  : 1945  MRN: [id number]  Primary Care Physician: Gabriela Olea APRN  Date of admission: 2024    Subjective   Subjective     Chief Complaint:  L shoulder pain    HPI:  Tommy Estrada is a 79 y.o. male with PMHx of CAD s/p stenting / thrombectomy (), ischemic cardiomyopathy (EF 40 % w/ ECHO 2023), HTN, dyslipidemia, GERD, arthritis s/p R knee replacement who presents to the ED for evaluation of left shoulder pain. 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 He has been taking tylenol and using a heating pad with some relief. Denies any known injury. He denies any chest pain or shortness of breath to me, however d/t concern with possible anginal equivalent and heart score of 6, ED requests admission. He will be admitted to the hospital medicine service. Currently rates left shoulder pain as 6/10. He denies any numbness, tingling or weakness of the LUE.There are no rashes. There is no loss of ROM. He does note that his heart medications have been keeping his blood pressure controlled, he is taking 1/2 doses of his carvedilol, entresto and isosorbide (of note, he was previously discontinued on the isosorbide per last cardiology office note but patient has been taking 1/2 dose daily.)    Review of Systems   Musculoskeletal:  Positive for arthralgias.   All other systems reviewed and are negative.       Personal History     Past Medical History:   Diagnosis Date    Coronary artery disease     COVID-19     Heart attack     HTN (hypertension)     Hyperlipidemia, unspecified        Past Surgical History:   Procedure Laterality Date     CARDIAC CATHETERIZATION N/A 05/27/2018    Procedure: Left Heart Cath;  Surgeon: Boaz Varela MD;  Location:  [facility];  Service: Cardiology    COLONOSCOPY N/A 11/01/2021    Procedure: COLONOSCOPY;  Surgeon: Brunner, Mark I, MD;  Location:  [facility];  Service: Gastroenterology;  Laterality: N/A;    CORONARY STENT PLACEMENT      KNEE SURGERY      KNEE SURGERY      SKIN SURGERY  05/2023    removal of sun spots    TOE SURGERY Right        Family History:  family history includes Cancer in his father; Coronary artery disease in his brother; Heart failure in his mother; Obesity in his brother; Osteoarthritis in his brother.     Social History:  reports that he has quit smoking. His smoking use included cigarettes. He has never used smokeless tobacco. He reports that he does not drink alcohol and does not use drugs.  Social History     Social History Narrative    The patient is  and lives with his spouse.  He has one son and one daughter.\    Caffeine 12 servings of laila 8 a day and coffee 1-2 times a month       Medications:  acetaminophen, aspirin, carvedilol, isosorbide mononitrate, multivitamin, nitroglycerin, rosuvastatin, and sacubitril-valsartan    No Known Allergies    Objective   Objective     Vital Signs:   Temp:  [98.7 °F (37.1 °C)] 98.7 °F (37.1 °C)  Heart Rate:  [74-86] 74  Resp:  [18] 18  BP: (170-201)/() 176/99    Physical Exam  Constitutional:       General: He is not in acute distress.     Appearance: Normal appearance. He is well-developed.   HENT:      Head: Normocephalic and atraumatic.      Nose: Nose normal.      Mouth/Throat:      Pharynx: Oropharynx is clear.   Eyes:      Extraocular Movements: Extraocular movements intact.      Conjunctiva/sclera: Conjunctivae normal.      Pupils: Pupils are equal, round, and reactive to light.   Cardiovascular:      Rate and Rhythm: Normal rate and regular rhythm.      Pulses: Normal pulses.      Heart sounds: Normal heart  sounds.   Pulmonary:      Effort: Pulmonary effort is normal.      Breath sounds: Normal breath sounds.   Abdominal:      General: Bowel sounds are normal. There is no distension.      Palpations: Abdomen is soft.      Tenderness: There is no abdominal tenderness.   Musculoskeletal:         General: Tenderness (L posterior shouder point tenderness) present. No swelling. Normal range of motion.      Cervical back: Normal range of motion and neck supple.   Skin:     General: Skin is warm and dry.      Capillary Refill: Capillary refill takes less than 2 seconds.      Findings: No rash.   Neurological:      Mental Status: He is alert and oriented to person, place, and time.      Cranial Nerves: No cranial nerve deficit.   Psychiatric:         Mood and Affect: Mood normal.         Behavior: Behavior normal.        Result Review:  I have personally reviewed the results from the time of this admission to 6/6/2024 00:46 EDT and agree with these findings:  [x]  Laboratory list / accordion  []  Microbiology  [x]  Radiology  [x]  EKG/Telemetry   []  Cardiology/Vascular   []  Pathology  []  Old records  []  Other:  Most notable findings include:     LAB RESULTS:      Lab 06/05/24 1928   WBC 7.63   HEMOGLOBIN 14.7   HEMATOCRIT 44.5   PLATELETS 221   NEUTROS ABS 4.69   IMMATURE GRANS (ABS) 0.04   LYMPHS ABS 1.90   MONOS ABS 0.77   EOS ABS 0.16   MCV 87.6   SED RATE 14         Lab 06/05/24 1928   SODIUM 141   POTASSIUM 4.2   CHLORIDE 106   CO2 25.0   ANION GAP 10.0   BUN 22   CREATININE 1.19   EGFR 61.8   GLUCOSE 124*   CALCIUM 9.7         Lab 06/05/24 1928   TOTAL PROTEIN 7.3   ALBUMIN 4.4   GLOBULIN 2.9   ALT (SGPT) 17   AST (SGOT) 18   BILIRUBIN 0.3   ALK PHOS 52   LIPASE 16         Lab 06/05/24 2156 06/05/24 1928   PROBNP  --  1,036.0   HSTROP T 23* 23*                 Brief Urine Lab Results  (Last result in the past 365 days)        Color   Clarity   Blood   Leuk Est   Nitrite   Protein   CREAT   Urine HCG         03/17/24 0035 Yellow   Clear   Negative   Negative   Negative   Negative                 Microbiology Results (last 10 days)       ** No results found for the last 240 hours. **            XR Shoulder 2+ View Left    Result Date: 6/6/2024  XR SHOULDER 2+ VW LEFT Date of Exam: 6/6/2024 12:12 AM EDT Indication: shoulder pain Comparison: None available. Findings: There is no acute fracture or dislocation. Acromioclavicular and glenohumeral joints appear intact. No erosions. Acromiohumeral and coracoclavicular distances are well-maintained. Mild to moderate acromioclavicular and glenohumeral osteoarthritic changes  are present. The adjacent lung and ribs appear intact.     Impression: Impression: No acute osseous abnormality. 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 Electronically Signed: Milena Reeves MD  6/6/2024 12:25 AM EDT  Workstation ID: [id number]    XR Chest 1 View    Result Date: 6/5/2024  XR CHEST 1 VW Date of Exam: 6/5/2024 7:52 PM EDT Indication: Chest Pain Triage Protocol Comparison: None available. Findings: There are no airspace consolidations. No pleural fluid. No pneumothorax. The pulmonary vasculature appears within normal limits. The cardiac and mediastinal silhouette appear borderline in size. No acute osseous abnormality identified.     Impression: Impression: No acute pulmonary process Electronically Signed: Sebastian Vital MD  6/5/2024 8:51 PM EDT  Workstation ID: [id number]     Results for orders placed during the hospital encounter of 10/10/23    Adult Transthoracic Echo Complete W/ Cont if Necessary Per Protocol    Interpretation Summary    Left ventricular systolic function is moderately decreased. Estimated left ventricular EF = 40%    Left ventricular wall thickness is consistent with mild concentric hypertrophy.    Left ventricular diastolic function is consistent with (grade I) impaired relaxation.    Mild aortic valve stenosis is present.  Mean  gradient 9 mmHg.      Assessment & Plan   Assessment & Plan       Chest pain    HTN (hypertension)    HLD (hyperlipidemia)    Ischemic cardiomyopathy    GERD (gastroesophageal reflux disease)    Acute pain of left shoulder    Acute L shoulder pain  CAD / ischemic cardiomyopathy  - concern for anginal equivalent  - xray L shoulder, consider more advanced imaging if pain continues  - toradol 15 mg x 1 now  - hold aspirin, patient takes daily aspirin at home and already took today  - troponin 23 w/ delta 0  - cardiology consult in am  - defer ECHO to cardiology, last ECHO 7/2023 noted w/ EF 40%  - add d dimer, consider CTA chest if positive  - NPO after MN until evaluated by cardiology  - repeat troponin in am  - add low dose ultram prn for shoulder pain  - diclofenac gel prn shoulder pain  - add esr/crp    HTN / HLD  - /106 on arrival, down to 173/98 w/o tx  - pt reports taking 1/2 doses of carvedilol, entresto and isosorbide d/t episodes of low BP, continue home doses for now  - check orthostatic BP  - elevated BP may be pain related, monitor for now and consider up titrating BP meds per cardiology recommendations      DVT prophylaxis:  Heparin SC TID    CODE STATUS:    Medical Intervention Limits: No intubation (DNI)  Code Status (Patient has no pulse and is not breathing): No CPR (Do Not Attempt to Resuscitate)  Medical Interventions (Patient has pulse or is breathing): Limited Support      Expected Discharge    Expected Discharge Date: 6/7/2024; Expected Discharge Time:     Signature: Electronically signed by HOLLIS Alonso, 06/06/24, 12:32 AM EDT      Total time spent: 60 minutes  Time spent includes time reviewing chart, face-to-face time, counseling patient/family/caregiver, ordering medications/tests/procedures, communicating with other health care professionals, documenting clinical information in the electronic health record, and coordination of care.

## 2024-06-06 NOTE — PROGRESS NOTES
[facility] Medicine Services  ADMISSION FOLLOW-UP NOTE          Patient admitted after midnight, H&P by my partner performed earlier on today's date reviewed.  Interim findings, labs, and charting also reviewed.        The [facility] Problem List has been managed and updated to include any new diagnoses:  Active Hospital Problems    Diagnosis  POA    GERD (gastroesophageal reflux disease) [K21.9]  Yes    Ischemic cardiomyopathy [I25.5]  Yes    HTN (hypertension) [I10]  Yes    HLD (hyperlipidemia) [E78.5]  Yes      Resolved Hospital Problems    Diagnosis Date Resolved POA    **Chest pain [R07.9] 06/06/2024 Yes    Acute pain of left shoulder [M25.512] 06/06/2024 Yes         ADDITIONAL PLAN:  - detailed assessment and plan from admission reviewed    - Patient admitted with shoulder pain relieved by voltaren. Cardiology consulted overnight and rec stress test. Could be d/c later today if stress neg.    Expected Discharge   Expected Discharge Date: 6/7/2024; Expected Discharge Time:      Ursula Elmore II,   06/06/24

## 2024-06-06 NOTE — THERAPY DISCHARGE NOTE
Patient Name: Tommy Estrada  : 1945    MRN: [id number]                              Today's Date: 2024       Admit Date: 2024    Visit Dx:     ICD-10-CM ICD-9-CM   1. Atypical chest pain  R07.89 786.59   2. Left arm pain  M79.602 729.5   3. History of myocardial infarction  I25.2 412   4. History of hypertension  Z86.79 V12.59   5. History of coronary artery disease  Z86.79 V12.59   6. History of hyperlipidemia  Z86.39 V12.29     Patient Active Problem List   Diagnosis    HTN (hypertension)    HLD (hyperlipidemia)    CAD (coronary artery disease)    Ischemic cardiomyopathy    GERD (gastroesophageal reflux disease)    Angina, class II    Heartburn    Acute hypoxemic respiratory failure due to COVID-19    GI bleed    Blood in stool     Past Medical History:   Diagnosis Date    Coronary artery disease     COVID-19     Heart attack     HTN (hypertension)     Hyperlipidemia, unspecified      Past Surgical History:   Procedure Laterality Date    CARDIAC CATHETERIZATION N/A 2018    Procedure: Left Heart Cath;  Surgeon: Boaz Varela MD;  Location:  [facility];  Service: Cardiology    COLONOSCOPY N/A 2021    Procedure: COLONOSCOPY;  Surgeon: Brunner, Mark I, MD;  Location:  [facility] ENDOSCOPY;  Service: Gastroenterology;  Laterality: N/A;    CORONARY STENT PLACEMENT      KNEE SURGERY      KNEE SURGERY      SKIN SURGERY  2023    removal of sun spots    TOE SURGERY Right       General Information       Row Name 24 1539          Physical Therapy Time and Intention    Document Type discharge evaluation/summary  -ND     Mode of Treatment physical therapy  -ND       Row Name 24 1539          General Information    Patient Profile Reviewed yes  -ND     Prior Level of Function independent:;all household mobility;community mobility;gait;transfer;driving;bed mobility;using stairs  Pt is independent at baseline without use of AD. Pt does wood working at baseline.  -ND      Existing Precautions/Restrictions no known precautions/restrictions  -ND     Barriers to Rehab none identified  -ND       Row Name 06/06/24 1539          Living Environment    People in Home spouse  -ND       Row Name 06/06/24 1539          Home Main Entrance    Number of Stairs, Main Entrance five;other (see comments)  Primary enterance from basement  -ND     Stair Railings, Main Entrance railing on right side (ascending)  -ND       Row Name 06/06/24 1539          Stairs Within Home, Primary    Stairs, Within Home, Primary stairs to get to workshop that he accesses regularly.  -ND     Number of Stairs, Within Home, Primary twelve  -ND     Stair Railings, Within Home, Primary railing on left side (ascending)  -ND       Row Name 06/06/24 1539          Cognition    Orientation Status (Cognition) oriented x 3  -ND       Row Name 06/06/24 1539          Safety Issues, Functional Mobility    Safety Issues Affecting Function (Mobility) safety precaution awareness  -ND               User Key  (r) = Recorded By, (t) = Taken By, (c) = Cosigned By      Initials Name Provider Type    ND Katiana Akbar, PT Physical Therapist                   Mobility       Row Name 06/06/24 1541          Bed Mobility    Bed Mobility supine-sit  -ND     Supine-Sit Steele (Bed Mobility) modified independence  -ND     Assistive Device (Bed Mobility) head of bed elevated  -ND       Row Name 06/06/24 1541          Sit-Stand Transfer    Sit-Stand Steele (Transfers) standby assist  -ND     Comment, (Sit-Stand Transfer) x2 from EOB.  -ND       Row Name 06/06/24 1541          Gait/Stairs (Locomotion)    Steele Level (Gait) standby assist  -ND     Distance in Feet (Gait) 350  -ND     Comment, (Gait/Stairs) Pt ambulating at baseline level without overt gait deviations. No LOB noted and pt denies chest pain, GONZALEZ, or lightheadedness with activity. Pt defers need to trial stairs, reports no previous issues and feels he is at his  baseline.  -ND               User Key  (r) = Recorded By, (t) = Taken By, (c) = Cosigned By      Initials Name Provider Type    Katiana López PT Physical Therapist                   Obj/Interventions       Row Name 06/06/24 1542          Range of Motion Comprehensive    General Range of Motion bilateral lower extremity ROM WFL  -ND       Row Name 06/06/24 1542          Strength Comprehensive (MMT)    General Manual Muscle Testing (MMT) Assessment no strength deficits identified  -ND       Row Name 06/06/24 1542          Balance    Balance Assessment sitting static balance;sitting dynamic balance;sit to stand dynamic balance;standing static balance;standing dynamic balance  -ND     Static Sitting Balance independent  -ND     Dynamic Sitting Balance independent  -ND     Position, Sitting Balance unsupported;sitting edge of bed  -ND     Sit to Stand Dynamic Balance standby assist  -ND     Static Standing Balance standby assist  -ND     Dynamic Standing Balance contact guard  -ND     Position/Device Used, Standing Balance unsupported  -ND     Balance Interventions sitting;standing;sit to stand;supported;static;dynamic;dynamic reaching;minimal challenge;single limb stance  -ND     Comment, Balance Pt picks object up off floor, performs marching, and negotiates obstacles in room without LOB.  -ND       Row Name 06/06/24 1542          Sensory Assessment (Somatosensory)    Sensory Assessment (Somatosensory) LE sensation intact  -ND               User Key  (r) = Recorded By, (t) = Taken By, (c) = Cosigned By      Initials Name Provider Type    Katiana López PT Physical Therapist                   Goals/Plan    No documentation.                  Clinical Impression       Row Name 06/06/24 1544          Pain    Pretreatment Pain Rating 0/10 - no pain  -ND     Posttreatment Pain Rating 0/10 - no pain  -ND       Row Name 06/06/24 1544          Plan of Care Review    Plan of Care Reviewed With patient;spouse   -ND     Outcome Evaluation PT evaluation. Pt is at his baseline levels of mobility and does not demonstrate any deficits that warrant IP PT services. PT to sign off, recommend d/c home with assist as needed.  -ND       Row Name 06/06/24 1544          Therapy Assessment/Plan (PT)    Criteria for Skilled Interventions Met (PT) no;no problems identified which require skilled intervention  -ND     Therapy Frequency (PT) evaluation only  -ND     Predicted Duration of Therapy Intervention (PT) 0  -ND       Row Name 06/06/24 1544          Vital Signs    Pre Systolic BP Rehab 165  -ND     Pre Treatment Diastolic BP 90  -ND     Post Systolic BP Rehab 167  -ND     Post Treatment Diastolic   -ND     Pretreatment Heart Rate (beats/min) 86  -ND     Posttreatment Heart Rate (beats/min) 88  -ND     Pre SpO2 (%) 94  -ND     O2 Delivery Pre Treatment room air  -ND     O2 Delivery Intra Treatment room air  -ND     Post SpO2 (%) 96  -ND     O2 Delivery Post Treatment room air  -ND     Pre Patient Position Supine  -ND     Intra Patient Position Standing  -ND     Post Patient Position Sitting  -ND       Row Name 06/06/24 1544          Positioning and Restraints    Pre-Treatment Position in bed  -ND     Post Treatment Position chair  -ND     In Chair notified nsg;reclined;legs elevated;call light within reach;encouraged to call for assist;exit alarm on;waffle cushion;with family/caregiver  -ND               User Key  (r) = Recorded By, (t) = Taken By, (c) = Cosigned By      Initials Name Provider Type    ND Katiana Akbar, PT Physical Therapist                   Outcome Measures       Row Name 06/06/24 1547          How much help from another person do you currently need...    Turning from your back to your side while in flat bed without using bedrails? 4  -ND     Moving from lying on back to sitting on the side of a flat bed without bedrails? 4  -ND     Moving to and from a bed to a chair (including a wheelchair)? 4  -ND      Standing up from a chair using your arms (e.g., wheelchair, bedside chair)? 4  -ND     Climbing 3-5 steps with a railing? 3  -ND     To walk in hospital room? 4  -ND     AM-PAC 6 Clicks Score (PT) 23  -ND     Highest Level of Mobility Goal 7 --> Walk 25 feet or more  -ND       Row Name 06/06/24 1547          Functional Assessment    Outcome Measure Options AM-PAC 6 Clicks Basic Mobility (PT)  -ND               User Key  (r) = Recorded By, (t) = Taken By, (c) = Cosigned By      Initials Name Provider Type    ND Katiana Akbar, SUREKHA Physical Therapist                  Physical Therapy Education       Title: PT OT SLP Therapies (In Progress)       Topic: Physical Therapy (In Progress)       Point: Mobility training (Done)       Learning Progress Summary             Patient Acceptance, E, VU by ND at 6/6/2024 1548                         Point: Home exercise program (Not Started)       Learner Progress:  Not documented in this visit.              Point: Body mechanics (Done)       Learning Progress Summary             Patient Acceptance, E, VU by ND at 6/6/2024 1548                         Point: Precautions (Done)       Learning Progress Summary             Patient Acceptance, E, VU by ND at 6/6/2024 1548                                         User Key       Initials Effective Dates Name Provider Type Discipline    ND 11/16/23 -  Katiana Akbar PT Physical Therapist PT                  PT Recommendation and Plan     Plan of Care Reviewed With: patient, spouse  Outcome Evaluation: PT evaluation. Pt is at his baseline levels of mobility and does not demonstrate any deficits that warrant IP PT services. PT to sign off, recommend d/c home with assist as needed.     Time Calculation:   PT Evaluation Complexity  History, PT Evaluation Complexity: 3 or more personal factors and/or comorbidities  Examination of Body Systems (PT Eval Complexity): total of 4 or more elements  Clinical Presentation (PT Evaluation  Complexity): stable  Clinical Decision Making (PT Evaluation Complexity): low complexity  Overall Complexity (PT Evaluation Complexity): low complexity     PT Charges       Row Name 06/06/24 1549             Time Calculation    Start Time 1515  -ND      PT Received On 06/06/24  -ND      PT Goal Re-Cert Due Date 06/06/24  -ND         Timed Charges    83399 - PT Therapeutic Activity Minutes 8  -ND         Untimed Charges    PT Eval/Re-eval Minutes 31  -ND         Total Minutes    Timed Charges Total Minutes 8  -ND      Untimed Charges Total Minutes 31  -ND       Total Minutes 39  -ND                User Key  (r) = Recorded By, (t) = Taken By, (c) = Cosigned By      Initials Name Provider Type    ND Katiana Akbar, PT Physical Therapist                  Therapy Charges for Today       Code Description Service Date Service Provider Modifiers Qty    95240437981 HC PT THERAPEUTIC ACT EA 15 MIN 6/6/2024 Katiana Akbar, PT GP 1    74013532331 HC PT EVAL LOW COMPLEXITY 3 6/6/2024 Katiana Akbar, PT GP 1            PT G-Codes  Outcome Measure Options: AM-PAC 6 Clicks Basic Mobility (PT)  AM-PAC 6 Clicks Score (PT): 23    PT Discharge Summary  Anticipated Discharge Disposition (PT): home with assist  Reason for Discharge: At baseline function  Discharge Destination: Home with assist    Katiana Akbar PT  6/6/2024

## 2024-06-06 NOTE — ED PROVIDER NOTES
EMERGENCY DEPARTMENT ENCOUNTER    Pt Name: Tommy Estrada  MRN: [id number]  Pt :   1945  Room Number:  S453/1  Date of encounter:  2024  PCP: Gabriela Olea APRN  ED Provider: TEODORO Long    Historian: Patient    HPI:  Chief Complaint: Chest Pain    Context: Tommy Estrada is a 79 y.o. male who presents to the ED c/o chest pain. Patient reports that he started to experience a pain in his chest that he feels radiating to his left shoulder to his elbow. He also reports and elevated blood pressure. Patient reports that his symptoms started on Monday. He shares that he felt a little better on Tuesday but that his pain returned today. Patient reports that activity makes his pain worse. He has not experienced any shortness of breath. No abnormal swelling. Patient has history of MI with stents. He follows with Dr. Varela with cardiology.   HPI     REVIEW OF SYSTEMS  A chief complaint appropriate review of systems was completed and is negative except as noted in the HPI.     PAST MEDICAL HISTORY  Past Medical History:   Diagnosis Date    Coronary artery disease     COVID-19     Heart attack     HTN (hypertension)     Hyperlipidemia, unspecified        PAST SURGICAL HISTORY  Past Surgical History:   Procedure Laterality Date    CARDIAC CATHETERIZATION N/A 2018    Procedure: Left Heart Cath;  Surgeon: Boaz Varela MD;  Location:  [facility];  Service: Cardiology    COLONOSCOPY N/A 2021    Procedure: COLONOSCOPY;  Surgeon: Brunner, Mark I, MD;  Location:  [facility];  Service: Gastroenterology;  Laterality: N/A;    CORONARY STENT PLACEMENT      KNEE SURGERY      KNEE SURGERY      SKIN SURGERY  2023    removal of sun spots    TOE SURGERY Right        FAMILY HISTORY  Family History   Problem Relation Age of Onset    Heart failure Mother     Cancer Father     Coronary artery disease Brother     Obesity Brother     Osteoarthritis Brother        SOCIAL HISTORY  Social  History     Socioeconomic History    Marital status:     Number of children: 2   Tobacco Use    Smoking status: Former     Current packs/day: 1.00     Types: Cigarettes    Smokeless tobacco: Never    Tobacco comments:     teenage years   Vaping Use    Vaping status: Never Used   Substance and Sexual Activity    Alcohol use: No    Drug use: No    Sexual activity: Defer       ALLERGIES  Patient has no known allergies.    PHYSICAL EXAM  Physical Exam  GENERAL:   Appears in no acute distress.  HENT: Nares patent.  EYES: No scleral icterus.  CV: Regular rhythm, regular rate.  RESPIRATORY: Normal effort.  No audible wheezes, rales or rhonchi.  ABDOMEN: Soft, nontender  MUSCULOSKELETAL: No deformities.   NEURO: Alert, moves all extremities, follows commands.  SKIN: Warm, dry, no rash visualized.    I have reviewed the triage vital signs and nursing notes.     LAB RESULTS  Results for orders placed or performed during the hospital encounter of 06/05/24   High Sensitivity Troponin T    Specimen: Blood   Result Value Ref Range    HS Troponin T 23 (H) <22 ng/L   Comprehensive Metabolic Panel    Specimen: Blood   Result Value Ref Range    Glucose 124 (H) 65 - 99 mg/dL    BUN 22 8 - 23 mg/dL    Creatinine 1.19 0.76 - 1.27 mg/dL    Sodium 141 136 - 145 mmol/L    Potassium 4.2 3.5 - 5.2 mmol/L    Chloride 106 98 - 107 mmol/L    CO2 25.0 22.0 - 29.0 mmol/L    Calcium 9.7 8.6 - 10.5 mg/dL    Total Protein 7.3 6.0 - 8.5 g/dL    Albumin 4.4 3.5 - 5.2 g/dL    ALT (SGPT) 17 1 - 41 U/L    AST (SGOT) 18 1 - 40 U/L    Alkaline Phosphatase 52 39 - 117 U/L    Total Bilirubin 0.3 0.0 - 1.2 mg/dL    Globulin 2.9 gm/dL    A/G Ratio 1.5 g/dL    BUN/Creatinine Ratio 18.5 7.0 - 25.0    Anion Gap 10.0 5.0 - 15.0 mmol/L    eGFR 61.8 >60.0 mL/min/1.73   Lipase    Specimen: Blood   Result Value Ref Range    Lipase 16 13 - 60 U/L   BNP    Specimen: Blood   Result Value Ref Range    proBNP 1,036.0 0.0 - 1,800.0 pg/mL   CBC Auto Differential     Specimen: Blood   Result Value Ref Range    WBC 7.63 3.40 - 10.80 10*3/mm3    RBC 5.08 4.14 - 5.80 10*6/mm3    Hemoglobin 14.7 13.0 - 17.7 g/dL    Hematocrit 44.5 37.5 - 51.0 %    MCV 87.6 79.0 - 97.0 fL    MCH 28.9 26.6 - 33.0 pg    MCHC 33.0 31.5 - 35.7 g/dL    RDW 13.7 12.3 - 15.4 %    RDW-SD 44.3 37.0 - 54.0 fl    MPV 9.8 6.0 - 12.0 fL    Platelets 221 140 - 450 10*3/mm3    Neutrophil % 61.5 42.7 - 76.0 %    Lymphocyte % 24.9 19.6 - 45.3 %    Monocyte % 10.1 5.0 - 12.0 %    Eosinophil % 2.1 0.3 - 6.2 %    Basophil % 0.9 0.0 - 1.5 %    Immature Grans % 0.5 0.0 - 0.5 %    Neutrophils, Absolute 4.69 1.70 - 7.00 10*3/mm3    Lymphocytes, Absolute 1.90 0.70 - 3.10 10*3/mm3    Monocytes, Absolute 0.77 0.10 - 0.90 10*3/mm3    Eosinophils, Absolute 0.16 0.00 - 0.40 10*3/mm3    Basophils, Absolute 0.07 0.00 - 0.20 10*3/mm3    Immature Grans, Absolute 0.04 0.00 - 0.05 10*3/mm3    nRBC 0.0 0.0 - 0.2 /100 WBC   High Sensitivity Troponin T 2Hr    Specimen: Blood   Result Value Ref Range    HS Troponin T 23 (H) <22 ng/L    Troponin T Delta 0 >=-4 - <+4 ng/L   C-reactive Protein    Specimen: Blood   Result Value Ref Range    C-Reactive Protein <0.30 0.00 - 0.50 mg/dL   Sedimentation Rate    Specimen: Blood   Result Value Ref Range    Sed Rate 14 0 - 20 mm/hr   D-dimer, Quantitative    Specimen: Blood   Result Value Ref Range    D-Dimer, Quantitative <0.27 0.00 - 0.80 MCGFEU/mL   ECG 12 Lead ED Triage Standing Order; Chest Pain   Result Value Ref Range    QT Interval 358 ms    QTC Interval 410 ms   Green Top (Gel)   Result Value Ref Range    Extra Tube Hold for add-ons.    Lavender Top   Result Value Ref Range    Extra Tube hold for add-on    Gold Top - SST   Result Value Ref Range    Extra Tube Hold for add-ons.    Gray Top   Result Value Ref Range    Extra Tube Hold for add-ons.    Light Blue Top   Result Value Ref Range    Extra Tube Hold for add-ons.        If labs were ordered, I independently reviewed the results and  considered them in treating the patient.    RADIOLOGY  XR Shoulder 2+ View Left   Final Result   Impression:   No acute osseous abnormality. 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         Electronically Signed: Milena Reeves MD     6/6/2024 12:25 AM EDT     Workstation ID: [id number]      XR Chest 1 View   Final Result   Impression:   No acute pulmonary process      Electronically Signed: Sebastian Vital MD     6/5/2024 8:51 PM EDT     Workstation ID: [id number]        [x] Radiologist's Report Reviewed:  I ordered and independently interpreted the above noted radiographic studies.  See radiologist's dictation for official interpretation.      PROCEDURES    Procedures    ECG 12 Lead ED Triage Standing Order; Chest Pain   Preliminary Result   Test Reason : ED Triage Standing Order~   Blood Pressure :   */*   mmHG   Vent. Rate :  79 BPM     Atrial Rate :  79 BPM      P-R Int : 224 ms          QRS Dur : 114 ms       QT Int : 358 ms       P-R-T Axes :  41   6 109 degrees      QTc Int : 410 ms      Sinus rhythm with 1st degree AV block   Anterolateral infarct (cited on or before 27-MAY-2018)   Abnormal ECG   When compared with ECG of 17-MAR-2024 02:58,   ST elevation now present in Anterior leads   T wave inversion no longer evident in Anterior leads      Referred By: EDMD           Confirmed By:       ECG 12 Lead ED Triage Standing Order; Chest Pain    (Results Pending)   ECG 12 Lead Chest Pain    (Results Pending)       MEDICATIONS GIVEN IN ER    Medications   sodium chloride 0.9 % flush 10 mL (has no administration in time range)   aspirin tablet 325 mg (has no administration in time range)   isosorbide mononitrate (IMDUR) 24 hr tablet 15 mg (has no administration in time range)   multivitamin (THERAGRAN) tablet 1 tablet (has no administration in time range)   rosuvastatin (CRESTOR) tablet 5 mg (has no administration in time range)   sodium chloride 0.9 % flush 10 mL (has no  administration in time range)   sodium chloride 0.9 % flush 10 mL (has no administration in time range)   sodium chloride 0.9 % infusion 40 mL (has no administration in time range)   nitroglycerin (NITROSTAT) SL tablet 0.4 mg (has no administration in time range)   acetaminophen (TYLENOL) tablet 650 mg (has no administration in time range)     Or   acetaminophen (TYLENOL) 160 MG/5ML oral solution 650 mg (has no administration in time range)     Or   acetaminophen (TYLENOL) suppository 650 mg (has no administration in time range)   Diclofenac Sodium (VOLTAREN) 1 % gel 2 g (has no administration in time range)   traMADol (ULTRAM) tablet 25 mg (25 mg Oral Given 6/6/24 0322)   sennosides-docusate (PERICOLACE) 8.6-50 MG per tablet 2 tablet (has no administration in time range)     And   polyethylene glycol (MIRALAX) packet 17 g (has no administration in time range)     And   bisacodyl (DULCOLAX) EC tablet 5 mg (has no administration in time range)     And   bisacodyl (DULCOLAX) suppository 10 mg (has no administration in time range)   melatonin tablet 5 mg (has no administration in time range)   ondansetron ODT (ZOFRAN-ODT) disintegrating tablet 4 mg (has no administration in time range)     Or   ondansetron (ZOFRAN) injection 4 mg (has no administration in time range)   heparin (porcine) 5000 UNIT/ML injection 5,000 Units (has no administration in time range)   carvedilol (COREG) tablet 6.25 mg (6.25 mg Oral Given 6/6/24 0333)   sacubitril-valsartan (ENTRESTO) 24-26 MG tablet 0.5 tablet (0.5 tablets Oral Given 6/6/24 0333)       MEDICAL DECISION MAKING, PROGRESS, and CONSULTS   Medical Decision Making  79-year-old nontoxic-appearing male presents the ER for evaluation of anginal equivalent left arm pain.  Patient with history of prior MI with cardiac stents.  Labs with acute abnormalities.  High-sensitivity troponin comparable to prior prior studies without significant delta.  EKG without evidence of acute ischemic  changes.  Chest x-ray without evidence of acute cardiopulmonary process.  Patient with heart score of 6.  Patient is very concerned that his symptoms may be related to his heart and has had admitted to hospital medicine for observation and additional cardiac workup.    Problems Addressed:  Atypical chest pain: acute illness or injury  Left arm pain: acute illness or injury    Amount and/or Complexity of Data Reviewed  Independent Historian: spouse     Details: Spouse contribution to HPI and review of systems.  External Data Reviewed: labs, ECG and notes.     Details: Personally reviewed office visit with cardiology from February 16, 2024 patient was seen evaluated with diagnosis of coronary artery disease, ischemic cardiomyopathy, hypertension and hyperlipidemia.  At this point in time he had a stable cardiac status.  Changes were made to his blood pressure medications secondary to side effects.  Follow-up in 6 months or sooner as needed.  Personally reviewed prior high-sensitivity troponins for comparison.  Prior EKGs also reported for comparison.  Labs: ordered. Decision-making details documented in ED Course.  Radiology: ordered and independent interpretation performed. Decision-making details documented in ED Course.  ECG/medicine tests: ordered and independent interpretation performed. Decision-making details documented in ED Course.    Risk  Decision regarding hospitalization.        All labs have been independently reviewed by me.  All radiology studies have been interpreted by me and the radiologist dictating the report.  All EKG's have been independently interpreted by me as well as and overseeing attending physician.    [] Discussed with radiology regarding test interpretation:    Discussion below represents my analysis of pertinent findings related to patient's condition, differential diagnosis, treatment plan and final disposition.    Differential diagnosis:  The differential diagnosis associated with  the patient's presentation includes: Congestive heart failure (volume overload), acute coronary syndrome (STEMI/NSTEMI), pulmonary embolism, pneumothorax, rib contusions and fractures, intercostal muscle strains, costochondritis, pneumonia, URI, myocarditis and GERD.     Additional sources  Discussed/ obtained information from independent historians:   [x] Spouse  [] Parent  [] Family member  [] Friend  [] EMS   [] Other:  External (non-ED) record review:   [] Inpatient record:   [x] Office record:   [] Outpatient record:   [] Prior Outpatient labs:   [] Prior Outpatient radiology:   [] Primary Care record:   [] Outside ED record:   [] Other:   Patient's care impacted by:   [] Diabetes  [x] Hypertension  [x] Hyperlipidemia  [] Hypothyroidism   [x] Coronary Artery Disease   [] COPD   [] Cancer   [] Obesity  [] GERD   [] Tobacco Abuse   [] Substance Abuse    [] Anxiety   [] Depression   [x] Other: Prior MI  Care significantly affected by Social Determinants of Health (housing and economic circumstances, unemployment)    [] Yes     [x] No   If yes, Patient's care significantly limited by  Social Determinants of Health including:   [] Inadequate housing   [] Low income   [] Alcoholism and drug addiction in family   [] Problems related to primary support group   [] Unemployment   [] Problems related to employment   [] Other Social Determinants of Health:     Shared decision making:  I reviewed workup performed in ED including labs and imaging. Based on findings, recommendation made for admission. Patient is agreeable to plan of care and hospital admission.      Orders placed during this visit:  Orders Placed This Encounter   Procedures    XR Chest 1 View    XR Shoulder 2+ View Left    Green Sea Draw    High Sensitivity Troponin T    Comprehensive Metabolic Panel    Lipase    BNP    CBC Auto Differential    High Sensitivity Troponin T 2Hr    C-reactive Protein    Sedimentation Rate    D-dimer, Quantitative    Basic  Metabolic Panel    CBC Auto Differential    High Sensitivity Troponin T    Lipid Panel    Hemoglobin A1c    NPO Diet NPO Type: Strict NPO    Undress & Gown    Activity As Tolerated    Notify Provider    Notify Provider for Discharge Orders When the Following Criteria are Met    Intake & Output    Weigh Patient    Maintain IV Access    Telemetry - Place Orders & Notify Provider of Results When Patient Experiences Acute Chest Pain, Dysrhythmia or Respiratory Distress    May Be Off Telemetry for Tests    Vital signs every 4 hours    Continuous Pulse Oximetry    Up With Assistance    Follow Standard Precautions    Code Status and Medical Interventions:    Inpatient Cardiology Consult    OT Consult: Eval & Treat ADL Performance Below Baseline    PT Consult: Eval & Treat Functional Mobility Below Baseline    Oxygen Therapy- Nasal Cannula; Titrate 1-6 LPM Per SpO2; 90 - 95%    ECG 12 Lead ED Triage Standing Order; Chest Pain    ECG 12 Lead ED Triage Standing Order; Chest Pain    ECG 12 Lead Chest Pain    ECG 12 Lead Chest Pain    Insert Peripheral IV    Insert Peripheral IV    Initiate Observation Status    ED Bed Request    CBC & Differential    Green Top (Gel)    Lavender Top    Gold Top - SST    Gray Top    Light Blue Top     ED Course:    ED Course as of 06/06/24 0355 Wed Jun 05, 2024 1914 EKG personally interpreted by myself in addition to interpretation by attending without evidence of acute ischemic changes.   [JG]   1934 Vitals and Telemetry tracing was reviewed and directly interpreted by myself demonstrating blood pressure 201/106, afebrile, heart rate of 86, respirations 18 breaths/min and oxygen saturation 98% on room air [JG]   1934 BP(!): 201/106 [JG]   1935 Temp: 98.7 °F (37.1 °C) [JG]   1935 Heart Rate: 86 [JG]   1935 Resp: 18 [JG]   1935 SpO2: 98 % [JG]   2019 Initial labs studies were reviewed and directly interpreted by myself and demonstrated blood glucose 124, initial high-sensitivity troponin 23  without abnormalities compared to prior values. [JG]   2020 Glucose(!): 124 [JG]   2020 HS Troponin T(!): 23 [JG]   2051 Imaging of chest personally interpreted by myself with official read provided by radiology demonstrated no acute cardiopulmonary process.   [JG]   2307 HEART Score for Major Cardiac Events -   Calculated on Jun 05 2024 11:07 PM  6 points -> Moderate Score (4-6 points) Risk of MACE of 12-16.6%.  If troponin is positive, many experts recommend further workup and admission even with a low HEART Score. [JG]   2346 Hospitalist messaged for admission and agreeable to accept patient [JG]      ED Course User Index  [JG] Gene Gan PA          DIAGNOSIS  Final diagnoses:   Atypical chest pain   Left arm pain   History of myocardial infarction   History of hypertension   History of coronary artery disease   History of hyperlipidemia       DISPOSITION    ED Disposition       ED Disposition   Decision to Admit    Condition   --    Comment   Level of Care: Telemetry [5]   Diagnosis: Chest pain [[id number]]   Admitting Physician: GWEN KENNEDY [[id number]]   Attending Physician: GWEN KENNEDY [[id number]]   Bed Request Comments: tele                 Please note that portions of this document were completed with voice recognition software.        Gene Gan PA  06/06/24 [id number]

## 2024-06-06 NOTE — NURSING NOTE
Patient arrival to floor from ER. /119, HR 81 at rest. Standing- /119, HR 85. C/o left neck and arm pain. Elevated BP since arrival to ER. Pain since arrival to ER. Toradol ordered in ER. Not given to patient. BP not treated in ER. Provider notifadan. Alfonso to review chart. Awaiting orders for BP. Tramadol 25 mg ordered and given to patient.

## 2024-06-06 NOTE — CONSULTS
Tommy Estrada  [id number]  1945   LOS: 0 days   Patient Care Team:  Gabriela Olea APRN as PCP - General (Family Medicine)    Mr. Estrada is an 79-year-old  white male from [place], [place].    Chief Complaint:  Anginal  equivalent     Problem List:  Coronary artery disease:  [facility], 05/27/2018: Total mid segment occlusion of the infarct-related mid LAD, s/p thrombectomy and 3.0 x 38 mm LAURA reducing her stenosis to 0%. Additional evidence of occluded apical branch of the LAD served by left-sided collaterals and nonobstructive disease of the LCx. Mild plaque of the RCA. EF 30%. LV diastolic dysfunction.  Echocardiogram, 05/27/2018: EF 46.5%. Mild concentric LVH. Mild MR/TR with RVSP 6 mmHg.  MPS, 09/13/2018: EF 37%. Reduced exercise capacity with 43% functional aerobic impairment as compared to predicted normal for age. Expected exercise time 6:40, actual exercise 4:00. THR achieved at 1:11. SANDRA +43. 100% of MPHR. Negative for anginal symptoms or diagnostic ST changes at moderate workload and THR. Large-sized infarct located in the anterior wall and apex with no significant ischemia.  Ischemic cardiomyopathy:  [facility], 05/27/2018: EF 30%. Severe LV systolic and diastolic dysfunction.  Echo, 05/28/2018: EF 40%.  Echo, 07/01/2022: EF 30%. Mild MR. Trace TR with normal RVSP.  Echo, 10/10/2023: EF 40%. Left ventricular diastolic function is consistent with (grade I) impaired relaxation. Mild AV stenosis. MG 9 mmHg.  Hypertension.  Dyslipidemia.  GERD.  Mild aortic stenosis  Left shoulder pain    No Known Allergies  Medications Prior to Admission   Medication Sig Dispense Refill Last Dose    acetaminophen (TYLENOL) 500 MG tablet Take 1 tablet by mouth Every 6 (Six) Hours As Needed for Mild Pain.   6/6/2024    aspirin 325 MG tablet TAKE ONE TABLET BY MOUTH DAILY 90 tablet 3 6/6/2024    carvedilol (COREG) 12.5 MG tablet TAKE 1 TABLET BY MOUTH TWICE A DAY WITH A MEAL **NEEDS APPOINTMENT** (Patient taking  differently: Take 0.5 tablets by mouth 2 (Two) Times a Day With Meals.) 180 tablet 2 6/6/2024    Entresto 24-26 MG tablet TAKE 1 TABLET BY MOUTH TWICE A DAY (Patient taking differently: Take 0.5 tablets by mouth 2 (Two) Times a Day.) 180 tablet 0 6/6/2024    isosorbide mononitrate (IMDUR) 30 MG 24 hr tablet Take 0.5 tablets by mouth Daily.   6/6/2024    multivitamin (THERAGRAN) tablet tablet Take  by mouth Daily.   6/6/2024    rosuvastatin (Crestor) 5 MG tablet Take 1 tablet by mouth Every Night.   6/6/2024    nitroglycerin (NITROSTAT) 0.4 MG SL tablet PLACE ONE TABLET UNDER TONGUE AS NEEDED FOR CHEST PAIN, MAY REPEAT DOSE EVERY 5 MINUTES FOR UP TO 3 DOSES 150 tablet 1 More than a month     Scheduled Meds:aspirin, 325 mg, Oral, Daily  carvedilol, 6.25 mg, Oral, BID With Meals  heparin (porcine), 5,000 Units, Subcutaneous, Q8H  isosorbide mononitrate, 15 mg, Oral, Daily  multivitamin, 1 tablet, Oral, Daily  rosuvastatin, 5 mg, Oral, Nightly  sacubitril-valsartan, 0.5 tablet, Oral, BID  sodium chloride, 10 mL, Intravenous, Q12H           History of Present Illness:   This is an 79-year-old white male who presented to [facility] with left shoulder/scapular pain with radiation to his left arm that felt like a deep aching and throbbing pain.  He did not use any nitroglycerin sublingual.  He denies any chest pain, shortness of breath, palpitations, dizziness, presyncope, syncope, nausea, or vomiting associated with his left arm pain.  The patient has had issues of hypotension and has only been able to take half doses of his carvedilol, Entresto, and isosorbide for GDMT.  He says his home blood pressures have looked good lately.  After his last office visit in February 2024 he was supposed to stop taking isosorbide but patient has still been taking 15 mg daily.  Troponins flat and 23, 23, 22.  Shoulder x-ray with no acute process but did show mild to moderate acromioclavicular and glenohumeral osteoarthritic changes.   "Patient's blood pressure on arrival was 201/106.  ECG with nonspecific changes.  Last echocardiogram in October 2023 showed EF 40%, mild AS.  The patient states that his left shoulder/arm pain has been constant for almost 3 days.  This does not worsen with activities and he has also noted some soreness on palpation.  He clearly denies any chest pain or shortness of breath.    Cardiac risk factors: advanced age (older than 55 for men, 65 for women), dyslipidemia, hypertension, and male gender.    Social History     Socioeconomic History    Marital status:     Number of children: 2   Tobacco Use    Smoking status: Former     Current packs/day: 1.00     Types: Cigarettes    Smokeless tobacco: Never    Tobacco comments:     teenage years   Vaping Use    Vaping status: Never Used   Substance and Sexual Activity    Alcohol use: No    Drug use: No    Sexual activity: Defer     Family History   Problem Relation Age of Onset    Heart failure Mother     Cancer Father     Coronary artery disease Brother     Obesity Brother     Osteoarthritis Brother        Review of Systems  10 point review of systems was completed, positives outlined in the HPI, and otherwise all other systems are negative.      Objective:       Physical Exam  /93 (BP Location: Left arm, Patient Position: Sitting)   Pulse 73   Temp 98.4 °F (36.9 °C) (Oral)   Resp 17   Ht 188 cm (74\")   Wt 103 kg (227 lb)   SpO2 (!) 89%   BMI 29.15 kg/m²       06/05/24  1906 06/06/24  0254   Weight: 104 kg (230 lb) 103 kg (227 lb)     Body mass index is 29.15 kg/m².    Intake/Output Summary (Last 24 hours) at 6/6/2024 0993  Last data filed at 6/6/2024 0856  Gross per 24 hour   Intake 50 ml   Output 0 ml   Net 50 ml       General Appearance:  Alert, cooperative, no distress, appears stated age   Head:  Normocephalic, without obvious abnormality, atraumatic   Neck: Supple, symmetrical, trachea midline, no adenopathy, thyroid: not enlarged, symmetric, no " tenderness/mass/nodules, no carotid bruit or JVD   Lungs:   Clear to auscultation bilaterally, respirations unlabored   Heart:  Regular rate and rhythm, S1, S2 normal, no murmur, rub or gallop   Abdomen:   Soft, nontender, no masses, no organomegaly, bowel sounds audible x4   Extremities: No edema, normal range of motion   Pulses: 2+ and symmetric   Skin: Skin color, texture, turgor normal, no rashes or lesions   Neurologic: Normal       Cardiographics  EKG 6/6/2024:  Sinus rhythm with 1st degree AV block  Possible Anterior infarct (cited on or before 27-MAY-2018)  Abnormal ECG  When compared with ECG of 05-JUN-2024 19:14, (Unconfirmed)  ST no longer elevated in Anterior leads  Nonspecific T wave abnormality now evident in Anterior leads    Imaging  Chest x-ray 6/5/2024:No acute pulmonary process .    Left shoulder x-ray 6/6/2024:  No acute osseous abnormality. 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     Lab Review   Results from last 7 days   Lab Units 06/06/24  0829 06/05/24  1928   SODIUM mmol/L 141 141   POTASSIUM mmol/L 3.8 4.2   CHLORIDE mmol/L 105 106   CO2 mmol/L 24.0 25.0   BUN mg/dL 17 22   CREATININE mg/dL 0.89 1.19   GLUCOSE mg/dL 125* 124*   CALCIUM mg/dL 9.6 9.7     Results from last 7 days   Lab Units 06/06/24  0717 06/05/24  1928   WBC 10*3/mm3 8.64 7.63   HEMOGLOBIN g/dL 15.2 14.7   HEMATOCRIT % 44.8 44.5   PLATELETS 10*3/mm3 222 221     Results from last 7 days   Lab Units 06/06/24  0829   CHOLESTEROL mg/dL 143   TRIGLYCERIDES mg/dL 122   HDL CHOL mg/dL 43   LDL CHOL mg/dL 78     Results from last 7 days   Lab Units 06/06/24  0717   HEMOGLOBIN A1C % 5.50     Results from last 7 days   Lab Units 06/06/24  0829 06/05/24  2156 06/05/24  1928   HSTROP T ng/L 22* 23* 23*     D-dimer less than 0.27  proBNP 1036    aspirin, 325 mg, Oral, Daily  carvedilol, 6.25 mg, Oral, BID With Meals  heparin (porcine), 5,000 Units, Subcutaneous, Q8H  isosorbide  mononitrate, 15 mg, Oral, Daily  multivitamin, 1 tablet, Oral, Daily  rosuvastatin, 5 mg, Oral, Nightly  sacubitril-valsartan, 0.5 tablet, Oral, BID  sodium chloride, 10 mL, Intravenous, Q12H        Assessment:   Patient presented with left shoulder/arm pain which is constant and appears atypical.  Angina equivalent was suspected.   EKG and troponin levels are unremarkable and do not suggest ACS.   History of CAD and cardiomyopathy without current heart failure symptoms.   Hypertension, elevated  Dyslipidemia, fairly well-controlled.      Plan:   ACS/MI ruled out.    Agree with aspirin and current statin therapy.  Increase Entresto to 1 tablet twice daily for better control of blood pressure.  Increase carvedilol to 12.5 mg twice daily for better control of blood pressure and for antianginal therapy.  Also continue current dose of Imdur.  .  Cardiac PET scan today for ischemic evaluation.  If this is unremarkable he can be discharged to home and will need evaluation for musculoskeletal etiology of symptoms.  Thank you for this consultation, we will follow.    Scribed for Boaz Varela MD by HOLLIS Erazo. 6/6/2024  10:14 EDT     IBoaz MD, personally performed the services described in this documentation as scribed by the above named individual in my presence, and it is both accurate and complete.  6/6/2024  11:47 EDT

## 2024-06-06 NOTE — PLAN OF CARE
Goal Outcome Evaluation:  Plan of Care Reviewed With: patient, spouse           Outcome Evaluation: OT eval completed. Pt presents close to baseline for ADL performance with no problems identified which require skilled intervention. Pt ambulated in room with SBA/no AD, completed dressing and grooming with Manfred. Recommend discharge home with assist when medically appropriate.      Anticipated Discharge Disposition (OT): home with assist

## 2024-06-06 NOTE — PLAN OF CARE
Goal Outcome Evaluation:  Plan of Care Reviewed With: patient, spouse           Outcome Evaluation: PT evaluation. Pt is at his baseline levels of mobility and does not demonstrate any deficits that warrant IP PT services. PT to sign off, recommend d/c home with assist as needed.      Anticipated Discharge Disposition (PT): home with assist

## 2024-06-06 NOTE — THERAPY EVALUATION
Patient Name: Tommy Estrada  : 1945    MRN: [id number]                              Today's Date: 2024       Admit Date: 2024    Visit Dx:     ICD-10-CM ICD-9-CM   1. Atypical chest pain  R07.89 786.59   2. Left arm pain  M79.602 729.5   3. History of myocardial infarction  I25.2 412   4. History of hypertension  Z86.79 V12.59   5. History of coronary artery disease  Z86.79 V12.59   6. History of hyperlipidemia  Z86.39 V12.29     Patient Active Problem List   Diagnosis    HTN (hypertension)    HLD (hyperlipidemia)    CAD (coronary artery disease)    Ischemic cardiomyopathy    GERD (gastroesophageal reflux disease)    Angina, class II    Heartburn    Acute hypoxemic respiratory failure due to COVID-19    GI bleed    Blood in stool     Past Medical History:   Diagnosis Date    Coronary artery disease     COVID-19     Heart attack     HTN (hypertension)     Hyperlipidemia, unspecified      Past Surgical History:   Procedure Laterality Date    CARDIAC CATHETERIZATION N/A 2018    Procedure: Left Heart Cath;  Surgeon: Boaz Varela MD;  Location:  [facility] CATH INVASIVE LOCATION;  Service: Cardiology    COLONOSCOPY N/A 2021    Procedure: COLONOSCOPY;  Surgeon: Brunner, Mark I, MD;  Location:  [facility] ENDOSCOPY;  Service: Gastroenterology;  Laterality: N/A;    CORONARY STENT PLACEMENT      KNEE SURGERY      KNEE SURGERY      SKIN SURGERY  2023    removal of sun spots    TOE SURGERY Right       General Information       Row Name 24 1602          OT Time and Intention    Document Type discharge evaluation/summary  -KATI     Mode of Treatment occupational therapy  -KATI       Row Name 24 1602          General Information    Patient Profile Reviewed yes  -KATI     Prior Level of Function independent:;ADL's;bed mobility;transfer;all household mobility;community mobility;home management;driving;using stairs;yard work  Ambulates without AD at baseline  -KATI     Existing Precautions/Restrictions no  known precautions/restrictions  -KATI     Barriers to Rehab none identified  -KATI       Row Name 06/06/24 1602          Occupational Profile    Environmental Supports and Barriers (Occupational Profile) Lives with spouse in multi-level home; ambulates without AD at baseline; enjoys woodworking and grandchildre  -KATI       Row Name 06/06/24 1602          Living Environment    People in Home spouse  -KATI       Row Name 06/06/24 1602          Home Main Entrance    Number of Stairs, Main Entrance five  -KATI     Stair Railings, Main Entrance railing on right side (ascending)  -KATI       Row Name 06/06/24 1602          Stairs Within Home, Primary    Stairs, Within Home, Primary enters home through basement; also has workshop separate from home with full set of stairs to enter  -KATI     Number of Stairs, Within Home, Primary twelve  -KATI     Stair Railings, Within Home, Primary railing on left side (ascending)  -KATI       Row Name 06/06/24 1602          Cognition    Orientation Status (Cognition) oriented x 3  -       Row Name 06/06/24 1602          Safety Issues, Functional Mobility    Safety Issues Affecting Function (Mobility) safety precaution awareness  -KATI     Impairments Affecting Function (Mobility) endurance/activity tolerance  -KATI               User Key  (r) = Recorded By, (t) = Taken By, (c) = Cosigned By      Initials Name Provider Type    Eliza Ramos OT Occupational Therapist                     Mobility/ADL's       Row Name 06/06/24 1605          Bed Mobility    Bed Mobility supine-sit  -KATI     Supine-Sit [place] (Bed Mobility) modified independence  -KATI     Assistive Device (Bed Mobility) head of bed elevated  -KATI     Comment, (Bed Mobility) Pt completed without difficulty  -KATI       Row Name 06/06/24 1605          Transfers    Transfers sit-stand transfer  -KATI       Row Name 06/06/24 1605          Sit-Stand Transfer    Sit-Stand Cherry (Transfers) standby assist  -KATI     Comment, (Sit-Stand  Transfer) no AD  -KATI       Row Name 06/06/24 1605          Functional Mobility    Functional Mobility- Ind. Level conditional independence  -     Functional Mobility-Distance (Feet) 5  -KATI     Functional Mobility- Comment Pt ambulated from sink to recliner on opposite side of room with no AD; no safety concerns noted  -[facility] Name 06/06/24 1605          Activities of Daily Living    BADL Assessment/Intervention lower body dressing;grooming  -KATI       Row Name 06/06/24 1605          Lower Body Dressing Assessment/Training    [place] Level (Lower Body Dressing) don;socks;set up  -     Position (Lower Body Dressing) edge of bed sitting  -KATI       Row Name 06/06/24 1605          Grooming Assessment/Training    [place] Level (Grooming) wash face, hands;modified independence  -     Oral Care other (see comments)  Pt completed earlier this morning  -KATI     Position (Grooming) sink side;unsupported standing  -               User Key  (r) = Recorded By, (t) = Taken By, (c) = Cosigned By      Initials Name Provider Type    Eliza Ramos OT Occupational Therapist                   Obj/Interventions       Row Name 06/06/24 1607          Sensory Assessment (Somatosensory)    Sensory Assessment (Somatosensory) UE sensation intact  -KATI       Row Name 06/06/24 1607          Vision Assessment/Intervention    Visual Impairment/Limitations WFL  -KATI       Row Name 06/06/24 1607          Range of Motion Comprehensive    General Range of Motion bilateral upper extremity ROM WFL  -KATI       Row Name 06/06/24 1607          Strength Comprehensive (MMT)    General Manual Muscle Testing (MMT) Assessment no strength deficits identified  -     Comment, General Manual Muscle Testing (MMT) Assessment BUE's  -KATI       Row Name 06/06/24 1607          Balance    Balance Assessment sitting static balance;sitting dynamic balance;standing static balance;standing dynamic balance  -     Static Sitting Balance independent   -KATI     Dynamic Sitting Balance independent  -KATI     Position, Sitting Balance unsupported;sitting edge of bed  -KATI     Static Standing Balance standby assist  -KATI     Dynamic Standing Balance contact guard  -KATI     Position/Device Used, Standing Balance unsupported  -KATI               User Key  (r) = Recorded By, (t) = Taken By, (c) = Cosigned By      Initials Name Provider Type    Eliza Ramos, OT Occupational Therapist                   Goals/Plan    No documentation.                  Clinical Impression       Row Name 06/06/24 1608          Pain Assessment    Pretreatment Pain Rating 0/10 - no pain  -KATI     Posttreatment Pain Rating 0/10 - no pain  -       Row Name 06/06/24 1608          Plan of Care Review    Plan of Care Reviewed With patient;spouse  -     Outcome Evaluation OT eval completed. Pt presents close to baseline for ADL performance with no problems identified which require skilled intervention. Pt ambulated in room with SBA/no AD, completed dressing and grooming with Manfred. Recommend discharge home with assist when medically appropriate.  -       Row Name 06/06/24 1608          Therapy Assessment/Plan (OT)    Patient/Family Therapy Goal Statement (OT) To go home.  -     Criteria for Skilled Therapeutic Interventions Met (OT) no;no problems identified which require skilled intervention;does not meet criteria for skilled intervention  -     Therapy Frequency (OT) evaluation only  -       Row Name 06/06/24 1608          Therapy Plan Review/Discharge Plan (OT)    Anticipated Discharge Disposition (OT) home with assist  -       Row Name 06/06/24 1608          Vital Signs    O2 Delivery Pre Treatment room air  -KATI     O2 Delivery Intra Treatment room air  -KATI     O2 Delivery Post Treatment room air  -KATI     Pre Patient Position Supine  -KATI     Intra Patient Position Standing  -KATI     Post Patient Position Sitting  -KATI       Row Name 06/06/24 1608          Positioning and Restraints     Pre-Treatment Position in bed  -KATI     Post Treatment Position chair  -KATI     In Chair notified nsg;reclined;call light within reach;encouraged to call for assist;with family/caregiver;waffle cushion;legs elevated  -KATI               User Key  (r) = Recorded By, (t) = Taken By, (c) = Cosigned By      Initials Name Provider Type    Eliza Ramos OT Occupational Therapist                   Outcome Measures       Row Name 06/06/24 1611          How much help from another is currently needed...    Putting on and taking off regular lower body clothing? 4  -KATI     Bathing (including washing, rinsing, and drying) 4  -KATI     Toileting (which includes using toilet bed pan or urinal) 4  -KATI     Putting on and taking off regular upper body clothing 4  -KATI     Taking care of personal grooming (such as brushing teeth) 4  -KATI     Eating meals 4  -KATI     AM-PAC 6 Clicks Score (OT) 24  -KATI       Row Name 06/06/24 1547          How much help from another person do you currently need...    Turning from your back to your side while in flat bed without using bedrails? 4  -ND     Moving from lying on back to sitting on the side of a flat bed without bedrails? 4  -ND     Moving to and from a bed to a chair (including a wheelchair)? 4  -ND     Standing up from a chair using your arms (e.g., wheelchair, bedside chair)? 4  -ND     Climbing 3-5 steps with a railing? 3  -ND     To walk in hospital room? 4  -ND     AM-PAC 6 Clicks Score (PT) 23  -ND     Highest Level of Mobility Goal 7 --> Walk 25 feet or more  -ND       Row Name 06/06/24 1611 06/06/24 1547       Functional Assessment    Outcome Measure Options AM-PAC 6 Clicks Daily Activity (OT)  -KATI AM-PAC 6 Clicks Basic Mobility (PT)  -ND              User Key  (r) = Recorded By, (t) = Taken By, (c) = Cosigned By      Initials Name Provider Type    Eliza Ramos OT Occupational Therapist    Katiana López, SUREKHA Physical Therapist                    Occupational Therapy Education        Title: PT OT SLP Therapies (In Progress)       Topic: Occupational Therapy (Not Started)       Point: ADL training (Not Started)       Description:   Instruct learner(s) on proper safety adaptation and remediation techniques during self care or transfers.   Instruct in proper use of assistive devices.                  Learner Progress:  Not documented in this visit.              Point: Home exercise program (Not Started)       Description:   Instruct learner(s) on appropriate technique for monitoring, assisting and/or progressing therapeutic exercises/activities.                  Learner Progress:  Not documented in this visit.              Point: Precautions (Not Started)       Description:   Instruct learner(s) on prescribed precautions during self-care and functional transfers.                  Learner Progress:  Not documented in this visit.              Point: Body mechanics (Not Started)       Description:   Instruct learner(s) on proper positioning and spine alignment during self-care, functional mobility activities and/or exercises.                  Learner Progress:  Not documented in this visit.                                  OT Recommendation and Plan  Therapy Frequency (OT): evaluation only  Plan of Care Review  Plan of Care Reviewed With: patient, spouse  Outcome Evaluation: OT eval completed. Pt presents close to baseline for ADL performance with no problems identified which require skilled intervention. Pt ambulated in room with SBA/no AD, completed dressing and grooming with Manfred. Recommend discharge home with assist when medically appropriate.     Time Calculation:   Evaluation Complexity (OT)  Review Occupational Profile/Medical/Therapy History Complexity: brief/low complexity  Assessment, Occupational Performance/Identification of Deficit Complexity: 1-3 performance deficits  Clinical Decision Making Complexity (OT): problem focused assessment/low complexity  Overall Complexity of  Evaluation (OT): low complexity     Time Calculation- OT       Row Name 06/06/24 1518             Time Calculation- OT    OT Start Time 1518  -KATI      OT Received On 06/06/24  -KATI      OT Goal Re-Cert Due Date 06/16/24  -KATI         Untimed Charges    OT Eval/Re-eval Minutes 48  -KATI         Total Minutes    Untimed Charges Total Minutes 48  -KATI       Total Minutes 48  -KATI                User Key  (r) = Recorded By, (t) = Taken By, (c) = Cosigned By      Initials Name Provider Type    Eliza Ramos OT Occupational Therapist                  Therapy Charges for Today       Code Description Service Date Service Provider Modifiers Qty    [id number] HC OT EVAL LOW COMPLEXITY 4 6/6/2024 Eliza Barnes OT GO 1                 Eliza Barnes OT  6/6/2024

## 2024-06-07 LAB
QT INTERVAL: 358 MS
QT INTERVAL: 378 MS
QTC INTERVAL: 410 MS
QTC INTERVAL: 427 MS

## 2024-08-06 ENCOUNTER — TELEPHONE (OUTPATIENT)
Dept: CARDIOLOGY | Facility: CLINIC | Age: 79
End: 2024-08-06
Payer: MEDICARE

## 2024-08-06 NOTE — TELEPHONE ENCOUNTER
Pt called in asking for medication refill on isosorbide. Last office note Dr. Varela mentions d/c isosorbide d/t pt having lower Bps. Pt states he has just been taking half a tablet daily since then and his BP has been good averaging around 130/80. Pt wants to know if its okay he keeps taking it like this what what would be preferred. Please advise

## 2024-08-08 RX ORDER — ISOSORBIDE MONONITRATE 30 MG/1
15 TABLET, EXTENDED RELEASE ORAL DAILY
Qty: 60 TABLET | Refills: 3 | Status: SHIPPED | OUTPATIENT
Start: 2024-08-08

## 2024-08-08 NOTE — TELEPHONE ENCOUNTER
Blood pressures are doing okay and he is not having any symptoms, it is okay for him to take his isosorbide at 15 mg daily.  Okay to refill.

## 2024-11-01 ENCOUNTER — OFFICE VISIT (OUTPATIENT)
Dept: CARDIOLOGY | Facility: CLINIC | Age: 79
End: 2024-11-01
Payer: MEDICARE

## 2024-11-01 VITALS
HEIGHT: 74 IN | HEART RATE: 75 BPM | BODY MASS INDEX: 29.98 KG/M2 | OXYGEN SATURATION: 97 % | DIASTOLIC BLOOD PRESSURE: 82 MMHG | SYSTOLIC BLOOD PRESSURE: 136 MMHG | WEIGHT: 233.6 LBS

## 2024-11-01 DIAGNOSIS — I25.5 ISCHEMIC CARDIOMYOPATHY: ICD-10-CM

## 2024-11-01 DIAGNOSIS — E78.5 DYSLIPIDEMIA: ICD-10-CM

## 2024-11-01 DIAGNOSIS — I25.10 CORONARY ARTERY DISEASE INVOLVING NATIVE CORONARY ARTERY OF NATIVE HEART WITHOUT ANGINA PECTORIS: Primary | ICD-10-CM

## 2024-11-01 DIAGNOSIS — I10 ESSENTIAL HYPERTENSION: ICD-10-CM

## 2024-11-01 RX ORDER — SACUBITRIL AND VALSARTAN 24; 26 MG/1; MG/1
1 TABLET, FILM COATED ORAL 2 TIMES DAILY
Qty: 180 TABLET | Refills: 3 | Status: SHIPPED | OUTPATIENT
Start: 2024-11-01

## 2024-11-01 NOTE — PROGRESS NOTES
[facility] Cardiology    Encounter Date: 2024    Patient ID: Tommy Estrada is a 79 y.o. male.  : 1945     PCP: Gabriela Olea APRN       Chief Complaint: Coronary artery disease involving native coronary artery of      PROBLEM LIST:  Coronary artery disease:  [facility], 2018: Total mid segment occlusion of the infarct-related mid LAD, s/p thrombectomy and 3.0 x 38 mm LAURA reducing her stenosis to 0%. Additional evidence of occluded apical branch of the LAD served by left-sided collaterals and nonobstructive disease of the LCx. Mild plaque of the RCA. EF 30%. LV diastolic dysfunction.  Echocardiogram, 2018: EF 46.5%. Mild concentric LVH. Mild MR/TR with RVSP 6 mmHg.  MPS, 2018: EF 37%. Reduced exercise capacity with 43% functional aerobic impairment as compared to predicted normal for age. Expected exercise time 6:40, actual exercise 4:00. THR achieved at 1:11. SANDRA +43. 100% of MPHR. Negative for anginal symptoms or diagnostic ST changes at moderate workload and THR. Large-sized infarct located in the anterior wall and apex with no significant ischemia.  MPS, 2024: EF 37%. A moderate-to-large-sized infarct located in the anterior wall and apex with no significant ischemia noted.   Ischemic cardiomyopathy:  [facility], 2018: EF 30%. Severe LV systolic and diastolic dysfunction.  Echo, 2018: EF 40%.  Echo, 2022: EF 30%. Mild MR. Trace TR with normal RVSP.  Echo, 10/10/2023: EF 40%. Left ventricular diastolic function is consistent with (grade I) impaired relaxation. Mild AV stenosis. MG 9 mmHg.  Hypertension.  Dyslipidemia.  GERD.    History of Present Illness  Patient presents today for a follow-up with a history of CAD, ICM, and cardiac risk factors. Since last visit, patient was seen in the ED with what was felt to be angina equivalent symptoms. He underwent stress testing which was unremarkable for ischemia. Since discharge, he has been  "doing well from a cardiac standpoint. Patient has been doing well from a cardiac standpoint.  Active and busy in daily life but does not have a regular exercise routine.  Tolerates activity well. BP has been well controlled at home. Patient denies any chest pain, shortness of air, palpitations, orthopnea, edema, presyncope or syncope.      No Known Allergies      Current Outpatient Medications:     acetaminophen (TYLENOL) 500 MG tablet, Take 1 tablet by mouth Every 6 (Six) Hours As Needed for Mild Pain., Disp: , Rfl:     aspirin 325 MG tablet, TAKE ONE TABLET BY MOUTH DAILY, Disp: 90 tablet, Rfl: 3    carvedilol (COREG) 12.5 MG tablet, Take 1 tablet by mouth 2 (Two) Times a Day With Meals., Disp: 60 tablet, Rfl: 2    Diclofenac Sodium (VOLTAREN) 1 % gel gel, Apply 2 g topically to the appropriate area as directed 4 (Four) Times a Day As Needed (Muscle/ Joint pain)., Disp: 100 g, Rfl: 0    isosorbide mononitrate (IMDUR) 30 MG 24 hr tablet, Take 0.5 tablets by mouth Daily., Disp: 60 tablet, Rfl: 3    multivitamin (THERAGRAN) tablet tablet, Take  by mouth Daily., Disp: , Rfl:     nitroglycerin (NITROSTAT) 0.4 MG SL tablet, PLACE ONE TABLET UNDER TONGUE AS NEEDED FOR CHEST PAIN, MAY REPEAT DOSE EVERY 5 MINUTES FOR UP TO 3 DOSES, Disp: 150 tablet, Rfl: 1    rosuvastatin (Crestor) 5 MG tablet, Take 1 tablet by mouth Every Night., Disp: , Rfl:     sacubitril-valsartan (Entresto) 24-26 MG tablet, Take 1 tablet by mouth 2 (Two) Times a Day., Disp: 60 tablet, Rfl: 2    The following portions of the patient's history were reviewed and updated as appropriate: allergies, current medications, past family history, past medical history, past social history, past surgical history and problem list.    ROS  Review of Systems   14 point ROS negative except for that listed in the HPI.         Objective:     /82   Pulse 75   Ht 188 cm (74\")   Wt 106 kg (233 lb 9.6 oz)   SpO2 97%   BMI 29.99 kg/m²      Physical " Exam  Constitutional: Patient appears well-developed and well-nourished.   HENT: HEENT exam unremarkable.   Neck: Neck supple. No JVD present.   Cardiovascular: Normal rate, regular rhythm and normal heart sounds. No murmur heard.   2+ symmetric pulses.   Pulmonary/Chest: Breath sounds normal.   Musculoskeletal: Does not exhibit edema.   Neurological: Neurological exam unremarkable.   Vitals reviewed.    Data Review:   Lab Results   Component Value Date    GLUCOSE 125 (H) 06/06/2024    BUN 17 06/06/2024    CREATININE 0.89 06/06/2024    EGFR 86.6 06/06/2024    BCR 19.1 06/06/2024     06/06/2024    K 3.8 06/06/2024    CO2 24.0 06/06/2024    CALCIUM 9.6 06/06/2024    ALBUMIN 4.4 06/05/2024    AST 18 06/05/2024    ALT 17 06/05/2024     Lab Results   Component Value Date    CHOL 143 06/06/2024    TRIG 122 06/06/2024    HDL 43 06/06/2024    LDL 78 06/06/2024      Lab Results   Component Value Date    WBC 8.64 06/06/2024    RBC 5.30 06/06/2024    HGB 15.2 06/06/2024    HCT 44.8 06/06/2024    MCV 84.5 06/06/2024     06/06/2024     Lab Results   Component Value Date    HGBA1C 5.50 06/06/2024        Procedures       Advance Care Planning   ACP discussion was held with the patient during this visit. Patient has an advance directive in EMR which is still valid.            Assessment and plan:      Diagnosis Plan   1. Coronary artery disease involving native coronary artery of native heart without angina pectoris  Stable without current angina.  Normal myocardial perfusion study 9/2024.  Continue aspirin, Crestor, and Imdur 15 mg daily.      2. Ischemic cardiomyopathy  EF is stable and patient is euvolemic.  Continue carvedilol and Entresto for GDMT for heart failure.      3. Essential hypertension  Well-controlled.  Continue current antihypertensive therapy.  Will not be too aggressive as he has dizziness with lower blood pressures.      4. Dyslipidemia  Continue Crestor 5 mg daily.        Stable cardiac status.    Continue current medications.   FU in 6 MO, sooner as needed.  Thank you for allowing us to participate in the care of your patient.       Eliza Aquino PA-C      Please note that portions of this note may have been completed with a voice recognition program. Efforts were made to edit the dictations, but occasionally words are mistranscribed.

## 2025-03-17 RX ORDER — ISOSORBIDE MONONITRATE 30 MG/1
15 TABLET, EXTENDED RELEASE ORAL DAILY
Qty: 90 TABLET | Refills: 1 | Status: SHIPPED | OUTPATIENT
Start: 2025-03-17

## 2025-03-17 RX ORDER — CARVEDILOL 12.5 MG/1
12.5 TABLET ORAL 2 TIMES DAILY WITH MEALS
Qty: 90 TABLET | Refills: 7 | Status: SHIPPED | OUTPATIENT
Start: 2025-03-17

## 2025-03-17 NOTE — TELEPHONE ENCOUNTER
Lab Results   Component Value Date    GLUCOSE 125 (H) 06/06/2024    BUN 17 06/06/2024    CREATININE 0.89 06/06/2024     06/06/2024    K 3.8 06/06/2024     06/06/2024    CALCIUM 9.6 06/06/2024    PROTEINTOT 7.3 06/05/2024    ALBUMIN 4.4 06/05/2024    ALT 17 06/05/2024    AST 18 06/05/2024    ALKPHOS 52 06/05/2024    BILITOT 0.3 06/05/2024    GLOB 2.9 06/05/2024    AGRATIO 1.5 06/05/2024    BCR 19.1 06/06/2024    ANIONGAP 12.0 06/06/2024    EGFR 86.6 06/06/2024

## 2025-06-27 ENCOUNTER — OFFICE VISIT (OUTPATIENT)
Dept: CARDIOLOGY | Facility: CLINIC | Age: 80
End: 2025-06-27
Payer: MEDICARE

## 2025-06-27 ENCOUNTER — TELEPHONE (OUTPATIENT)
Dept: CARDIOLOGY | Facility: CLINIC | Age: 80
End: 2025-06-27

## 2025-06-27 ENCOUNTER — TELEPHONE (OUTPATIENT)
Dept: CARDIOLOGY | Facility: CLINIC | Age: 80
End: 2025-06-27
Payer: MEDICARE

## 2025-06-27 VITALS
HEART RATE: 75 BPM | OXYGEN SATURATION: 96 % | HEIGHT: 74 IN | DIASTOLIC BLOOD PRESSURE: 88 MMHG | SYSTOLIC BLOOD PRESSURE: 124 MMHG | BODY MASS INDEX: 30.29 KG/M2 | WEIGHT: 236 LBS

## 2025-06-27 DIAGNOSIS — I25.10 CORONARY ARTERY DISEASE INVOLVING NATIVE CORONARY ARTERY OF NATIVE HEART WITHOUT ANGINA PECTORIS: Primary | ICD-10-CM

## 2025-06-27 DIAGNOSIS — E78.5 DYSLIPIDEMIA: ICD-10-CM

## 2025-06-27 DIAGNOSIS — I35.0 AORTIC STENOSIS, MILD: ICD-10-CM

## 2025-06-27 DIAGNOSIS — I25.5 ISCHEMIC CARDIOMYOPATHY: ICD-10-CM

## 2025-06-27 DIAGNOSIS — I10 ESSENTIAL HYPERTENSION: ICD-10-CM

## 2025-06-27 RX ORDER — ISOSORBIDE MONONITRATE 30 MG/1
15 TABLET, EXTENDED RELEASE ORAL DAILY
Qty: 90 TABLET | Refills: 3 | Status: SHIPPED | OUTPATIENT
Start: 2025-06-27

## 2025-06-27 NOTE — PROGRESS NOTES
[facility] Cardiology    Encounter Date: 2025    Patient ID: Tommy Estrada is a 80 y.o. male.  : 1945     PCP: Gabriela Olea APRN       Chief Complaint: Coronary artery disease involving native coronary artery of      PROBLEM LIST:  Coronary artery disease:  [facility], 2018: Total mid segment occlusion of the infarct-related mid LAD, s/p thrombectomy and 3.0 x 38 mm LAURA reducing her stenosis to 0%. Additional evidence of occluded apical branch of the LAD served by left-sided collaterals and nonobstructive disease of the LCx. Mild plaque of the RCA. EF 30%. LV diastolic dysfunction.  Echocardiogram, 2018: EF 46.5%. Mild concentric LVH. Mild MR/TR with RVSP 6 mmHg.  MPS, 2018: EF 37%. Reduced exercise capacity with 43% functional aerobic impairment as compared to predicted normal for age. Expected exercise time 6:40, actual exercise 4:00. THR achieved at 1:11. SANDRA +43. 100% of MPHR. Negative for anginal symptoms or diagnostic ST changes at moderate workload and THR. Large-sized infarct located in the anterior wall and apex with no significant ischemia.  MPS, 2024: EF 37%. A moderate-to-large-sized infarct located in the anterior wall and apex with no significant ischemia noted.   Ischemic cardiomyopathy:  [facility], 2018: EF 30%. Severe LV systolic and diastolic dysfunction.  Echo, 2018: EF 40%.  Echo, 2022: EF 30%. Mild MR. Trace TR with normal RVSP.  Echo, 10/10/2023: EF 40%. Left ventricular diastolic function is consistent with (grade I) impaired relaxation. Mild AV stenosis. MG 9 mmHg.  Hypertension.  Dyslipidemia.  GERD.    History of Present Illness  Patient presents today for a follow-up with a history of ischemic cardiomyopathy, CAD, and cardiac risk factors. Since last visit, Patient has been doing well from a cardiac standpoint.  Active and busy in daily life but does not have a regular exercise routine.  Tolerates activity well.  "Patient denies any chest pain, shortness of air, palpitations, orthopnea, edema, presyncope or syncope. BP has been well controlled. No ER visits or hospitalizations.         No Known Allergies      Current Outpatient Medications:     acetaminophen (TYLENOL) 500 MG tablet, Take 1 tablet by mouth Every 6 (Six) Hours As Needed for Mild Pain., Disp: , Rfl:     aspirin 325 MG tablet, TAKE ONE TABLET BY MOUTH DAILY, Disp: 90 tablet, Rfl: 3    carvedilol (COREG) 12.5 MG tablet, Take 1 tablet by mouth 2 (Two) Times a Day With Meals., Disp: 90 tablet, Rfl: 7    isosorbide mononitrate (IMDUR) 30 MG 24 hr tablet, Take 0.5 tablets by mouth Daily., Disp: 90 tablet, Rfl: 1    rosuvastatin (Crestor) 5 MG tablet, Take 1 tablet by mouth Every Night., Disp: , Rfl:     sacubitril-valsartan (Entresto) 24-26 MG tablet, Take 1 tablet by mouth 2 (Two) Times a Day., Disp: 180 tablet, Rfl: 3    The following portions of the patient's history were reviewed and updated as appropriate: allergies, current medications, past family history, past medical history, past social history, past surgical history and problem list.    ROS  Review of Systems   14 point ROS negative except for that listed in the HPI.         Objective:     /88 (BP Location: Right arm, Patient Position: Sitting, Cuff Size: Adult)   Pulse 75   Ht 188 cm (74\")   Wt 107 kg (236 lb)   SpO2 96%   BMI 30.30 kg/m²      Physical Exam  Constitutional: Patient appears well-developed and well-nourished.   HENT: HEENT exam unremarkable.   Neck: Neck supple. No JVD present.   Cardiovascular: Normal rate, regular rhythm and normal heart sounds. No murmur heard.   2+ symmetric pulses.   Pulmonary/Chest: Breath sounds normal. Does not exhibit tenderness.   Musculoskeletal: Does not exhibit edema.   Vitals reviewed.    Data Review:   Lab date: 03/17/2025  FLP: , , HDL 38,   CMP: Glu 108, BUN 21, Creat 0.94,Na 141, K 4.6, Cl 106, CO2 29, Ca 9.5, Alk Phos 44, AST " 18, ALT 20    Procedures       Advance Care Planning   ACP discussion was held with the patient during this visit. Patient does not have an advance directive, declines further assistance.           Assessment and plan:      Diagnosis Plan   1. Coronary artery disease involving native coronary artery of native heart without angina pectoris  Stable without current angina.  Continue aspirin for antiplatelet therapy.  Continue current statin therapy.         2. Ischemic cardiomyopathy  Euvolemic without any CHF symptoms.  Continue carvedilol and Entresto.      3. Essential hypertension  Well-controlled.  Continue isosorbide, Coreg, and Entresto      4. Dyslipidemia  Continue statin therapy.        Stable cardiac status.   Continue current medications.   FU in 6 MO, sooner as needed.  Thank you for allowing us to participate in the care of your patient.       Eliza Aquino PA-C      Please note that portions of this note may have been completed with a voice recognition program. Efforts were made to edit the dictations, but occasionally words are mistranscribed.

## 2025-06-27 NOTE — TELEPHONE ENCOUNTER
Emailed pt Up to Date document on the DASH diet and also mentioned getting more info at library about the Mediterranean Diet. Encouraged to email back with further questions.

## 2025-06-27 NOTE — TELEPHONE ENCOUNTER
"  Caller: Tommy Estrada \"Tom\"    Relationship: Self    Best call back number: [phone]     What is the best time to reach you: ANY    Who are you requesting to speak with (clinical staff, provider,  specific staff member): ANY        What was the call regarding: PATIENT STATES HE RECEIVED A CALL FROM THE OFFICE EARLIER ON 6-27-25 BUT NO ONE WAS ON THE OTHER LINE. PLEASE CONTACT PATIENT IN REGARDS TO THIS ISSUE.    Is it okay if the provider responds through [employer]: PLEASE CALL    "

## 2025-07-10 ENCOUNTER — TELEPHONE (OUTPATIENT)
Dept: CARDIOLOGY | Facility: CLINIC | Age: 80
End: 2025-07-10
Payer: MEDICARE

## 2025-07-10 RX ORDER — ISOSORBIDE MONONITRATE 30 MG/1
30 TABLET, EXTENDED RELEASE ORAL DAILY
Qty: 90 TABLET | Refills: 1 | Status: SHIPPED | OUTPATIENT
Start: 2025-07-10

## 2025-07-10 NOTE — TELEPHONE ENCOUNTER
Received call from pt 7/1 concerning Imdur Rx. Spoke with ABHIJIT Haider and she said okay to change Rx per pt request.